# Patient Record
Sex: FEMALE | Race: WHITE | Employment: UNEMPLOYED | URBAN - METROPOLITAN AREA
[De-identification: names, ages, dates, MRNs, and addresses within clinical notes are randomized per-mention and may not be internally consistent; named-entity substitution may affect disease eponyms.]

---

## 2018-09-24 ENCOUNTER — OFFICE VISIT (OUTPATIENT)
Dept: INTERNAL MEDICINE CLINIC | Facility: CLINIC | Age: 53
End: 2018-09-24

## 2018-09-24 VITALS
OXYGEN SATURATION: 99 % | WEIGHT: 159 LBS | BODY MASS INDEX: 23.55 KG/M2 | TEMPERATURE: 98.2 F | RESPIRATION RATE: 18 BRPM | HEART RATE: 76 BPM | DIASTOLIC BLOOD PRESSURE: 80 MMHG | HEIGHT: 69 IN | SYSTOLIC BLOOD PRESSURE: 130 MMHG

## 2018-09-24 DIAGNOSIS — R07.89 MUSCULOSKELETAL CHEST PAIN: ICD-10-CM

## 2018-09-24 DIAGNOSIS — R03.0 PRE-HYPERTENSION: ICD-10-CM

## 2018-09-24 DIAGNOSIS — Z12.83 SCREENING EXAM FOR SKIN CANCER: ICD-10-CM

## 2018-09-24 DIAGNOSIS — Z76.89 ENCOUNTER TO ESTABLISH CARE: Primary | ICD-10-CM

## 2018-09-24 DIAGNOSIS — Z23 ENCOUNTER FOR IMMUNIZATION: ICD-10-CM

## 2018-09-24 NOTE — PATIENT INSTRUCTIONS
Vaccine Information Statement    Influenza (Flu) Vaccine (Inactivated or Recombinant): What you need to know    Many Vaccine Information Statements are available in [place] and other languages. See www.immunize.org/vis  Hojas de Información Sobre Vacunas están disponibles en Español y en muchos otros idiomas. Visite www.immunize.org/vis    1. Why get vaccinated? Influenza (flu) is a contagious disease that spreads around the [place] every year, usually between October and May. Flu is caused by influenza viruses, and is spread mainly by coughing, sneezing, and close contact. Anyone can get flu. Flu strikes suddenly and can last several days. Symptoms vary by age, but can include:   fever/chills   sore throat   muscle aches   fatigue   cough   headache    runny or stuffy nose    Flu can also lead to pneumonia and blood infections, and cause diarrhea and seizures in children. If you have a medical condition, such as heart or lung disease, flu can make it worse. Flu is more dangerous for some people. Infants and young children, people 72years of age and older, pregnant women, and people with certain health conditions or a weakened immune system are at greatest risk. Each year thousands of people in the [facility] die from flu, and many more are hospitalized. Flu vaccine can:   keep you from getting flu,   make flu less severe if you do get it, and   keep you from spreading flu to your family and other people. 2. Inactivated and recombinant flu vaccines    A dose of flu vaccine is recommended every flu season. Children 6 months through 6years of age may need two doses during the same flu season. Everyone else needs only one dose each flu season.        Some inactivated flu vaccines contain a very small amount of a mercury-based preservative called thimerosal. Studies have not shown thimerosal in vaccines to be harmful, but flu vaccines that do not contain thimerosal are available. There is no live flu virus in flu shots. They cannot cause the flu. There are many flu viruses, and they are always changing. Each year a new flu vaccine is made to protect against three or four viruses that are likely to cause disease in the upcoming flu season. But even when the vaccine doesnt exactly match these viruses, it may still provide some protection    Flu vaccine cannot prevent:   flu that is caused by a virus not covered by the vaccine, or   illnesses that look like flu but are not. It takes about 2 weeks for protection to develop after vaccination, and protection lasts through the flu season. 3. Some people should not get this vaccine    Tell the person who is giving you the vaccine:     If you have any severe, life-threatening allergies. If you ever had a life-threatening allergic reaction after a dose of flu vaccine, or have a severe allergy to any part of this vaccine, you may be advised not to get vaccinated. Most, but not all, types of flu vaccine contain a small amount of egg protein.  If you ever had Guillain-Barré Syndrome (also called GBS). Some people with a history of GBS should not get this vaccine. This should be discussed with your doctor.  If you are not feeling well. It is usually okay to get flu vaccine when you have a mild illness, but you might be asked to come back when you feel better. 4. Risks of a vaccine reaction    With any medicine, including vaccines, there is a chance of reactions. These are usually mild and go away on their own, but serious reactions are also possible. Most people who get a flu shot do not have any problems with it.      Minor problems following a flu shot include:    soreness, redness, or swelling where the shot was given     hoarseness   sore, red or itchy eyes   cough   fever   aches   headache   itching   fatigue  If these problems occur, they usually begin soon after the shot and last 1 or 2 days. More serious problems following a flu shot can include the following:     There may be a small increased risk of Guillain-Barré Syndrome (GBS) after inactivated flu vaccine. This risk has been estimated at 1 or 2 additional cases per million people vaccinated. This is much lower than the risk of severe complications from flu, which can be prevented by flu vaccine.  Young children who get the flu shot along with pneumococcal vaccine (PCV13) and/or DTaP vaccine at the same time might be slightly more likely to have a seizure caused by fever. Ask your doctor for more information. Tell your doctor if a child who is getting flu vaccine has ever had a seizure. Problems that could happen after any injected vaccine:      People sometimes faint after a medical procedure, including vaccination. Sitting or lying down for about 15 minutes can help prevent fainting, and injuries caused by a fall. Tell your doctor if you feel dizzy, or have vision changes or ringing in the ears.  Some people get severe pain in the shoulder and have difficulty moving the arm where a shot was given. This happens very rarely.  Any medication can cause a severe allergic reaction. Such reactions from a vaccine are very rare, estimated at about 1 in a million doses, and would happen within a few minutes to a few hours after the vaccination. As with any medicine, there is a very remote chance of a vaccine causing a serious injury or death. The safety of vaccines is always being monitored. For more information, visit: www.cdc.gov/vaccinesafety/    5. What if there is a serious reaction? What should I look for?  Look for anything that concerns you, such as signs of a severe allergic reaction, very high fever, or unusual behavior.     Signs of a severe allergic reaction can include hives, swelling of the face and throat, difficulty breathing, a fast heartbeat, dizziness, and weakness - usually within a few minutes to a few hours after the vaccination. What should I do?  If you think it is a severe allergic reaction or other emergency that cant wait, call 9-1-1 and get the person to the nearest hospital. Otherwise, call your doctor.  Reactions should be reported to the Vaccine Adverse Event Reporting System (VAERS). Your doctor should file this report, or you can do it yourself through  the VAERS web site at www.vaers. [facility].gov, or by calling 0-256.610.3255. VAERS does not give medical advice. 6. The National Vaccine Injury Compensation Program    The [facility] Vaccine Injury Compensation Program (VICP) is a federal program that was created to compensate people who may have been injured by certain vaccines. Persons who believe they may have been injured by a vaccine can learn about the program and about filing a claim by calling 2-634.477.4441 or visiting the [employer] website at www.[facility].gov/vaccinecompensation. There is a time limit to file a claim for compensation. 7. How can I learn more?  Ask your healthcare provider. He or she can give you the vaccine package insert or suggest other sources of information.  Call your local or state health department.  Contact the Centers for Disease Control and Prevention (CDC):  - Call 8-866.622.8594 (1-800-CDC-INFO) or  - Visit CDCs website at www.cdc.gov/flu    Vaccine Information Statement   Inactivated Influenza Vaccine   8/7/2015  43 KAYLA Enrique [id number]    Department of Health and Human Services  Centers for Disease Control and Prevention    Office Use Only       High Blood Pressure: Care Instructions  Your Care Instructions    If your blood pressure is usually above 130/80, you have high blood pressure, or hypertension. That means the top number is 130 or higher or the bottom number is 80 or higher, or both. Despite what a lot of people think, high blood pressure usually doesn't cause headaches or make you feel dizzy or lightheaded.  It usually has no symptoms. But it does increase your risk for heart attack, stroke, and kidney or eye damage. The higher your blood pressure, the more your risk increases. Your doctor will give you a goal for your blood pressure. Your goal will be based on your health and your age. Lifestyle changes, such as eating healthy and being active, are always important to help lower blood pressure. You might also take medicine to reach your blood pressure goal.  Follow-up care is a key part of your treatment and safety. Be sure to make and go to all appointments, and call your doctor if you are having problems. It's also a good idea to know your test results and keep a list of the medicines you take. How can you care for yourself at home? Medical treatment  · If you stop taking your medicine, your blood pressure will go back up. You may take one or more types of medicine to lower your blood pressure. Be safe with medicines. Take your medicine exactly as prescribed. Call your doctor if you think you are having a problem with your medicine. · Talk to your doctor before you start taking aspirin every day. Aspirin can help certain people lower their risk of a heart attack or stroke. But taking aspirin isn't right for everyone, because it can cause serious bleeding. · See your doctor regularly. You may need to see the doctor more often at first or until your blood pressure comes down. · If you are taking blood pressure medicine, talk to your doctor before you take decongestants or anti-inflammatory medicine, such as ibuprofen. Some of these medicines can raise blood pressure. · Learn how to check your blood pressure at home. Lifestyle changes  · Stay at a healthy weight. This is especially important if you put on weight around the waist. Losing even 10 pounds can help you lower your blood pressure. · If your doctor recommends it, get more exercise. Walking is a good choice. Bit by bit, increase the amount you walk every day.  Try for at least 30 minutes on most days of the week. You also may want to swim, bike, or do other activities. · Avoid or limit alcohol. Talk to your doctor about whether you can drink any alcohol. · Try to limit how much sodium you eat to less than 2,300 milligrams (mg) a day. Your doctor may ask you to try to eat less than 1,500 mg a day. · Eat plenty of fruits (such as bananas and oranges), vegetables, legumes, whole grains, and low-fat dairy products. · Lower the amount of saturated fat in your diet. Saturated fat is found in animal products such as milk, cheese, and meat. Limiting these foods may help you lose weight and also lower your risk for heart disease. · Do not smoke. Smoking increases your risk for heart attack and stroke. If you need help quitting, talk to your doctor about stop-smoking programs and medicines. These can increase your chances of quitting for good. When should you call for help? Call 911 anytime you think you may need emergency care. This may mean having symptoms that suggest that your blood pressure is causing a serious heart or blood vessel problem. Your blood pressure may be over 180/110.   For example, call 911 if:    · You have symptoms of a heart attack. These may include:  ¨ Chest pain or pressure, or a strange feeling in the chest.  ¨ Sweating. ¨ Shortness of breath. ¨ Nausea or vomiting. ¨ Pain, pressure, or a strange feeling in the back, neck, jaw, or upper belly or in one or both shoulders or arms. ¨ Lightheadedness or sudden weakness. ¨ A fast or irregular heartbeat.     · You have symptoms of a stroke. These may include:  ¨ Sudden numbness, tingling, weakness, or loss of movement in your face, arm, or leg, especially on only one side of your body. ¨ Sudden vision changes. ¨ Sudden trouble speaking. ¨ Sudden confusion or trouble understanding simple statements. ¨ Sudden problems with walking or balance.   ¨ A sudden, severe headache that is different from past headaches.     · You have severe back or belly pain.    Do not wait until your blood pressure comes down on its own. Get help right away.   Call your doctor now or seek immediate care if:    · Your blood pressure is much higher than normal (such as 180/110 or higher), but you don't have symptoms.     · You think high blood pressure is causing symptoms, such as:  ¨ Severe headache. ¨ Blurry vision.    Watch closely for changes in your health, and be sure to contact your doctor if:    · Your blood pressure measures 140/90 or higher at least 2 times. That means the top number is 140 or higher or the bottom number is 90 or higher, or both.     · You think you may be having side effects from your blood pressure medicine.     · Your blood pressure is usually normal, but it goes above normal at least 2 times. Where can you learn more? Go to http://leyda-beni.info/. Enter V294 in the search box to learn more about \"High Blood Pressure: Care Instructions. \"  Current as of: December 6, 2017  Content Version: 11.7  © 8822-8660 [employer]. Care instructions adapted under license by [employer] (which disclaims liability or warranty for this information). If you have questions about a medical condition or this instruction, always ask your healthcare professional. [address] any warranty or liability for your use of this information. Learning About High Blood Pressure  What is high blood pressure? Blood pressure is a measure of how hard the blood pushes against the walls of your arteries. It's normal for blood pressure to go up and down throughout the day, but if it stays up, you have high blood pressure. Another name for high blood pressure is hypertension. Two numbers tell you your blood pressure. The first number is the systolic pressure. It shows how hard the blood pushes when your heart is pumping. The second number is the diastolic pressure. It shows how hard the blood pushes between heartbeats, when your heart is relaxed and filling with blood. A blood pressure of less than 120/80 (say \"120 over 80\") is ideal for an adult. High blood pressure is 130/80 or higher. You have high blood pressure if your top number is 130 or higher or your bottom number is 80 or higher, or both. What happens when you have high blood pressure? · Blood flows through your arteries with too much force. Over time, this damages the walls of your arteries. But you can't feel it. High blood pressure usually doesn't cause symptoms. · Fat and calcium start to build up in your arteries. This buildup is called plaque. Plaque makes your arteries narrower and stiffer. Blood can't flow through them as easily. · This lack of good blood flow starts to damage some of the organs in your body. This can lead to problems such as coronary artery disease and heart attack, heart failure, stroke, kidney failure, and eye damage. How can you prevent high blood pressure? · Stay at a healthy weight. · Try to limit how much sodium you eat to less than 2,300 milligrams (mg) a day. If you limit your sodium to 1,500 mg a day, you can lower your blood pressure even more. ¨ Buy foods that are labeled \"unsalted,\" \"sodium-free,\" or \"low-sodium. \" Foods labeled \"reduced-sodium\" and \"light sodium\" may still have too much sodium. ¨ Flavor your food with garlic, lemon juice, onion, vinegar, herbs, and spices instead of salt. Do not use soy sauce, steak sauce, onion salt, garlic salt, mustard, or ketchup on your food. ¨ Use less salt (or none) when recipes call for it. You can often use half the salt a recipe calls for without losing flavor. · Be physically active. Get at least 30 minutes of exercise on most days of the week. Walking is a good choice. You also may want to do other activities, such as running, swimming, cycling, or playing tennis or team sports.   · Limit alcohol to 2 drinks a day for men and 1 drink a day for women. · Eat plenty of fruits, vegetables, and low-fat dairy products. Eat less saturated and total fats. How is high blood pressure treated? · Your doctor will suggest making lifestyle changes. For example, your doctor may ask you to eat healthy foods, quit smoking, lose extra weight, and be more active. · If lifestyle changes don't help enough or your blood pressure is very high, you will have to take medicine every day. Follow-up care is a key part of your treatment and safety. Be sure to make and go to all appointments, and call your doctor if you are having problems. It's also a good idea to know your test results and keep a list of the medicines you take. Where can you learn more? Go to http://leyda-beni.info/. Enter P501 in the search box to learn more about \"Learning About High Blood Pressure. \"  Current as of: May 10, 2017  Content Version: 11.7  © 1244-3804 [employer]. Care instructions adapted under license by [employer] (which disclaims liability or warranty for this information). If you have questions about a medical condition or this instruction, always ask your healthcare professional. [place] [address] any warranty or liability for your use of this information. Well Visit, Women 48 to 72: Care Instructions  Your Care Instructions    Physical exams can help you stay healthy. Your doctor has checked your overall health and may have suggested ways to take good care of yourself. He or she also may have recommended tests. At home, you can help prevent illness with healthy eating, regular exercise, and other steps. Follow-up care is a key part of your treatment and safety. Be sure to make and go to all appointments, and call your doctor if you are having problems. It's also a good idea to know your test results and keep a list of the medicines you take. How can you care for yourself at home?   · Reach and stay at a healthy weight. This will lower your risk for many problems, such as obesity, diabetes, heart disease, and high blood pressure. · Get at least 30 minutes of exercise on most days of the week. Walking is a good choice. You also may want to do other activities, such as running, swimming, cycling, or playing tennis or team sports. · Do not smoke. Smoking can make health problems worse. If you need help quitting, talk to your doctor about stop-smoking programs and medicines. These can increase your chances of quitting for good. · Protect your skin from too much sun. When you're outdoors from 10 a.m. to 4 p.m., stay in the shade or cover up with clothing and a hat with a wide brim. Wear sunglasses that block UV rays. Even when it's cloudy, put broad-spectrum sunscreen (SPF 30 or higher) on any exposed skin. · See a dentist one or two times a year for checkups and to have your teeth cleaned. · Wear a seat belt in the car. · Limit alcohol to 1 drink a day. Too much alcohol can cause health problems. Follow your doctor's advice about when to have certain tests. These tests can spot problems early. · Cholesterol. Your doctor will tell you how often to have this done based on your age, family history, or other things that can increase your risk for heart attack and stroke. · Blood pressure. Have your blood pressure checked during a routine doctor visit. Your doctor will tell you how often to check your blood pressure based on your age, your blood pressure results, and other factors. · Mammogram. Ask your doctor how often you should have a mammogram, which is an X-ray of your breasts. A mammogram can spot breast cancer before it can be felt and when it is easiest to treat. · Pap test and pelvic exam. Ask your doctor how often you should have a Pap test. You may not need to have a Pap test as often as you used to. · Vision. Have your eyes checked every year or two or as often as your doctor suggests.  Some experts recommend that you have yearly exams for glaucoma and other age-related eye problems starting at age 48. · Hearing. Tell your doctor if you notice any change in your hearing. You can have tests to find out how well you hear. · Diabetes. Ask your doctor whether you should have tests for diabetes. · Colon cancer. You should begin tests for colon cancer at age 48. You may have one of several tests. Your doctor will tell you how often to have tests based on your age and risk. Risks include whether you already had a precancerous polyp removed from your colon or whether your parents, sisters and brothers, or children have had colon cancer. · Thyroid disease. Talk to your doctor about whether to have your thyroid checked as part of a regular physical exam. Women have an increased chance of a thyroid problem. · Osteoporosis. You should begin tests for bone density at age 72. If you are younger than 72, ask your doctor whether you have factors that may increase your risk for this disease. You may want to have this test before age 72. · Heart attack and stroke risk. At least every 4 to 6 years, you should have your risk for heart attack and stroke assessed. Your doctor uses factors such as your age, blood pressure, cholesterol, and whether you smoke or have diabetes to show what your risk for a heart attack or stroke is over the next 10 years. When should you call for help? Watch closely for changes in your health, and be sure to contact your doctor if you have any problems or symptoms that concern you. Where can you learn more? Go to http://leyda-beni.info/. Enter U247 in the search box to learn more about \"Well Visit, Women 50 to 72: Care Instructions. \"  Current as of: May 16, 2017  Content Version: 11.7  © 7418-7426 [employer]. Care instructions adapted under license by [employer] (which disclaims liability or warranty for this information).  If you have questions about a medical condition or this instruction, always ask your healthcare professional. [place] [address] any warranty or liability for your use of this information.
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
## 2018-09-24 NOTE — MR AVS SNAPSHOT
[address] [address] [phone] Patient: Laura Martinez MRN: [id number] :[id number] Visit Information Date & Time Provider Department Dept. Phone Encounter #  
 2018 11:00 AM Christopher Crain, [address] [facility] [address] [place] [address] [id number] Follow-up Instructions Return in about 3 months (around 2018), or if symptoms worsen or fail to improve, for blood pressure. Upcoming Health Maintenance Date Due  
 PAP AKA CERVICAL CYTOLOGY 1987 Shingrix Vaccine Age 50> (1 of 2) 2016 BREAST CANCER SCRN MAMMOGRAM 2016 FOBT Q 1 YEAR AGE 50-75 2016 Influenza Age 5 to Adult 2018 DTaP/Tdap/Td series (2 - Td) 6/15/2022 Allergies as of 2018  Review Complete On: 2018 By: Christopher Crain NP Severity Noted Reaction Type Reactions Adhesive  2018    Hives Adhesive tapes Sulfa (Sulfonamide Antibiotics)  2011    Nausea and Vomiting Topamax [Topiramate]  2011    Nausea and Vomiting Current Immunizations  Reviewed on 2018 Name Date Influenza Vaccine (Quad) PF  Incomplete TDAP Vaccine 6/15/2012  1:37 PM  
  
 Reviewed by Matti Zapata LPN on 3311 at 98:87 AM  
You Were Diagnosed With   
  
 Codes Comments Encounter to establish care    -  Primary ICD-10-CM: Z76.89 
ICD-9-CM: V65.8 Encounter for immunization     ICD-10-CM: M30 ICD-9-CM: V03.89 Screening exam for skin cancer     ICD-10-CM: Z12.83 ICD-9-CM: V76.43 Pre-hypertension     ICD-10-CM: R03.0 ICD-9-CM: 796.2 Vitals BP Pulse Temp Resp Height(growth percentile) Weight(growth percentile) 130/80 76 98.2 °F (36.8 °C) (Oral) 18 5' 9\" (1.753 m) 159 lb (72.1 kg) SpO2 BMI OB Status Smoking Status 99% 23.48 kg/m2 Menopause Never Smoker Vitals History BMI and BSA Data Body Mass Index Body Surface Area  
 23.48 kg/m 2 1.87 m 2 Preferred Pharmacy Pharmacy Name Phone [id number] [address] [phone] Your Updated Medication List  
  
Notice  As of 9/24/2018 11:38 AM  
 You have not been prescribed any medications. We Performed the Following INFLUENZA VIRUS VAC QUAD,SPLIT,PRESV FREE SYRINGE IM [id number] CPT(R)] WI IMMUNIZ ADMIN,1 SINGLE/COMB VAC/TOXOID [id number] CPT(R)] REFERRAL TO DERMATOLOGY [REF19 Custom] Follow-up Instructions Return in about 3 months (around 12/24/2018), or if symptoms worsen or fail to improve, for blood pressure. Referral Information Referral ID Referred By Referred To  
  
 [id number] [facility] [address] Phone: [phone] Fax: [phone] Visits Status Start Date End Date 1 New Request 9/24/18 9/24/19 If your referral has a status of pending review or denied, additional information will be sent to support the outcome of this decision. Patient Instructions Vaccine Information Statement Influenza (Flu) Vaccine (Inactivated or Recombinant): What you need to know Many Vaccine Information Statements are available in [place] and other languages. See www.immunize.org/vis Hojas de Información Sobre Vacunas están disponibles en Español y en muchos otros idiomas. Visite www.immunize.org/vis 1. Why get vaccinated? Influenza (flu) is a contagious disease that spreads around the [place] every year, usually between October and May. Flu is caused by influenza viruses, and is spread mainly by coughing, sneezing, and close contact. Anyone can get flu. Flu strikes suddenly and can last several days. Symptoms vary by age, but can include: 
 fever/chills  sore throat  muscle aches  fatigue  cough  headache  runny or stuffy nose Flu can also lead to pneumonia and blood infections, and cause diarrhea and seizures in children. If you have a medical condition, such as heart or lung disease, flu can make it worse. Flu is more dangerous for some people. Infants and young children, people 72years of age and older, pregnant women, and people with certain health conditions or a weakened immune system are at greatest risk. Each year thousands of people in the [facility] die from flu, and many more are hospitalized. Flu vaccine can: 
 keep you from getting flu, 
 make flu less severe if you do get it, and 
 keep you from spreading flu to your family and other people. 2. Inactivated and recombinant flu vaccines A dose of flu vaccine is recommended every flu season. Children 6 months through 6years of age may need two doses during the same flu season. Everyone else needs only one dose each flu season. Some inactivated flu vaccines contain a very small amount of a mercury-based preservative called thimerosal. Studies have not shown thimerosal in vaccines to be harmful, but flu vaccines that do not contain thimerosal are available. There is no live flu virus in flu shots. They cannot cause the flu. There are many flu viruses, and they are always changing. Each year a new flu vaccine is made to protect against three or four viruses that are likely to cause disease in the upcoming flu season. But even when the vaccine doesnt exactly match these viruses, it may still provide some protection Flu vaccine cannot prevent: 
 flu that is caused by a virus not covered by the vaccine, or 
 illnesses that look like flu but are not. It takes about 2 weeks for protection to develop after vaccination, and protection lasts through the flu season. 3. Some people should not get this vaccine Tell the person who is giving you the vaccine:  If you have any severe, life-threatening allergies. If you ever had a life-threatening allergic reaction after a dose of flu vaccine, or have a severe allergy to any part of this vaccine, you may be advised not to get vaccinated. Most, but not all, types of flu vaccine contain a small amount of egg protein.  If you ever had Guillain-Barré Syndrome (also called GBS). Some people with a history of GBS should not get this vaccine. This should be discussed with your doctor.  If you are not feeling well. It is usually okay to get flu vaccine when you have a mild illness, but you might be asked to come back when you feel better. 4. Risks of a vaccine reaction With any medicine, including vaccines, there is a chance of reactions. These are usually mild and go away on their own, but serious reactions are also possible. Most people who get a flu shot do not have any problems with it. Minor problems following a flu shot include:  
 soreness, redness, or swelling where the shot was given  hoarseness  sore, red or itchy eyes  cough  fever  aches  headache  itching  fatigue If these problems occur, they usually begin soon after the shot and last 1 or 2 days. More serious problems following a flu shot can include the following:  There may be a small increased risk of Guillain-Barré Syndrome (GBS) after inactivated flu vaccine. This risk has been estimated at 1 or 2 additional cases per million people vaccinated. This is much lower than the risk of severe complications from flu, which can be prevented by flu vaccine.  Young children who get the flu shot along with pneumococcal vaccine (PCV13) and/or DTaP vaccine at the same time might be slightly more likely to have a seizure caused by fever. Ask your doctor for more information. Tell your doctor if a child who is getting flu vaccine has ever had a seizure. Problems that could happen after any injected vaccine:  People sometimes faint after a medical procedure, including vaccination. Sitting or lying down for about 15 minutes can help prevent fainting, and injuries caused by a fall. Tell your doctor if you feel dizzy, or have vision changes or ringing in the ears.  Some people get severe pain in the shoulder and have difficulty moving the arm where a shot was given. This happens very rarely.  Any medication can cause a severe allergic reaction. Such reactions from a vaccine are very rare, estimated at about 1 in a million doses, and would happen within a few minutes to a few hours after the vaccination. As with any medicine, there is a very remote chance of a vaccine causing a serious injury or death. The safety of vaccines is always being monitored. For more information, visit: www.cdc.gov/vaccinesafety/ 
 
 
6. The National Vaccine Injury Compensation Program 
 
The [facility] Vaccine Injury Compensation Program (VICP) is a federal program that was created to compensate people who may have been injured by certain vaccines. Persons who believe they may have been injured by a vaccine can learn about the program and about filing a claim by calling 0-164.393.7989 or visiting the [employer] website at www.[facility].gov/vaccinecompensation. There is a time limit to file a claim for compensation. 7. How can I learn more?  Ask your healthcare provider. He or she can give you the vaccine package insert or suggest other sources of information.  Call your local or state health department.  Contact the Centers for Disease Control and Prevention (CDC): 
- Call 4-836.452.1617 (1-800-CDC-INFO) or 
- Visit CDCs website at www.cdc.gov/flu Vaccine Information Statement Inactivated Influenza Vaccine 8/7/2015 
42 KAYLA Thomas [id number] [facility] [employer] Centers for Disease Control and Prevention Office Use Only High Blood Pressure: Care Instructions Your Care Instructions If your blood pressure is usually above 130/80, you have high blood pressure, or hypertension. That means the top number is 130 or higher or the bottom number is 80 or higher, or both. Despite what a lot of people think, high blood pressure usually doesn't cause headaches or make you feel dizzy or lightheaded. It usually has no symptoms. But it does increase your risk for heart attack, stroke, and kidney or eye damage. The higher your blood pressure, the more your risk increases. Your doctor will give you a goal for your blood pressure. Your goal will be based on your health and your age. Lifestyle changes, such as eating healthy and being active, are always important to help lower blood pressure. You might also take medicine to reach your blood pressure goal. 
Follow-up care is a key part of your treatment and safety.  Be sure to make and go to all appointments, and call your doctor if you are having problems. It's also a good idea to know your test results and keep a list of the medicines you take. How can you care for yourself at home? Medical treatment · If you stop taking your medicine, your blood pressure will go back up. You may take one or more types of medicine to lower your blood pressure. Be safe with medicines. Take your medicine exactly as prescribed. Call your doctor if you think you are having a problem with your medicine. · Talk to your doctor before you start taking aspirin every day. Aspirin can help certain people lower their risk of a heart attack or stroke. But taking aspirin isn't right for everyone, because it can cause serious bleeding. · See your doctor regularly. You may need to see the doctor more often at first or until your blood pressure comes down. · If you are taking blood pressure medicine, talk to your doctor before you take decongestants or anti-inflammatory medicine, such as ibuprofen. Some of these medicines can raise blood pressure. · Learn how to check your blood pressure at home. Lifestyle changes · Stay at a healthy weight. This is especially important if you put on weight around the waist. Losing even 10 pounds can help you lower your blood pressure. · If your doctor recommends it, get more exercise. Walking is a good choice. Bit by bit, increase the amount you walk every day. Try for at least 30 minutes on most days of the week. You also may want to swim, bike, or do other activities. · Avoid or limit alcohol. Talk to your doctor about whether you can drink any alcohol. · Try to limit how much sodium you eat to less than 2,300 milligrams (mg) a day. Your doctor may ask you to try to eat less than 1,500 mg a day. · Eat plenty of fruits (such as bananas and oranges), vegetables, legumes, whole grains, and low-fat dairy products. · Lower the amount of saturated fat in your diet.  Saturated fat is found in animal products such as milk, cheese, and meat. Limiting these foods may help you lose weight and also lower your risk for heart disease. · Do not smoke. Smoking increases your risk for heart attack and stroke. If you need help quitting, talk to your doctor about stop-smoking programs and medicines. These can increase your chances of quitting for good. When should you call for help? Call 911 anytime you think you may need emergency care. This may mean having symptoms that suggest that your blood pressure is causing a serious heart or blood vessel problem. Your blood pressure may be over 180/110. 
 For example, call 911 if: 
  · You have symptoms of a heart attack. These may include: ¨ Chest pain or pressure, or a strange feeling in the chest. 
¨ Sweating. ¨ Shortness of breath. ¨ Nausea or vomiting. ¨ Pain, pressure, or a strange feeling in the back, neck, jaw, or upper belly or in one or both shoulders or arms. ¨ Lightheadedness or sudden weakness. ¨ A fast or irregular heartbeat.  
  · You have symptoms of a stroke. These may include: 
¨ Sudden numbness, tingling, weakness, or loss of movement in your face, arm, or leg, especially on only one side of your body. ¨ Sudden vision changes. ¨ Sudden trouble speaking. ¨ Sudden confusion or trouble understanding simple statements. ¨ Sudden problems with walking or balance. ¨ A sudden, severe headache that is different from past headaches.  
  · You have severe back or belly pain.  
 Do not wait until your blood pressure comes down on its own. Get help right away. 
 Call your doctor now or seek immediate care if: 
  · Your blood pressure is much higher than normal (such as 180/110 or higher), but you don't have symptoms.  
  · You think high blood pressure is causing symptoms, such as: ¨ Severe headache. ¨ Blurry vision.  
 Watch closely for changes in your health, and be sure to contact your doctor if:   · Your blood pressure measures 140/90 or higher at least 2 times. That means the top number is 140 or higher or the bottom number is 90 or higher, or both.  
  · You think you may be having side effects from your blood pressure medicine.  
  · Your blood pressure is usually normal, but it goes above normal at least 2 times. Where can you learn more? Go to http://leyda-beni.info/. Enter E142 in the search box to learn more about \"High Blood Pressure: Care Instructions. \" Current as of: December 6, 2017 Content Version: 11.7 © 5529-6149 [employer]. Care instructions adapted under license by [employer] (which disclaims liability or warranty for this information). If you have questions about a medical condition or this instruction, always ask your healthcare professional. [address] any warranty or liability for your use of this information. Learning About High Blood Pressure What is high blood pressure? Blood pressure is a measure of how hard the blood pushes against the walls of your arteries. It's normal for blood pressure to go up and down throughout the day, but if it stays up, you have high blood pressure. Another name for high blood pressure is hypertension. Two numbers tell you your blood pressure. The first number is the systolic pressure. It shows how hard the blood pushes when your heart is pumping. The second number is the diastolic pressure. It shows how hard the blood pushes between heartbeats, when your heart is relaxed and filling with blood. A blood pressure of less than 120/80 (say \"120 over 80\") is ideal for an adult. High blood pressure is 130/80 or higher. You have high blood pressure if your top number is 130 or higher or your bottom number is 80 or higher, or both. What happens when you have high blood pressure? · Blood flows through your arteries with too much force.  Over time, this damages the walls of your arteries. But you can't feel it. High blood pressure usually doesn't cause symptoms. · Fat and calcium start to build up in your arteries. This buildup is called plaque. Plaque makes your arteries narrower and stiffer. Blood can't flow through them as easily. · This lack of good blood flow starts to damage some of the organs in your body. This can lead to problems such as coronary artery disease and heart attack, heart failure, stroke, kidney failure, and eye damage. How can you prevent high blood pressure? · Stay at a healthy weight. · Try to limit how much sodium you eat to less than 2,300 milligrams (mg) a day. If you limit your sodium to 1,500 mg a day, you can lower your blood pressure even more. ¨ Buy foods that are labeled \"unsalted,\" \"sodium-free,\" or \"low-sodium. \" Foods labeled \"reduced-sodium\" and \"light sodium\" may still have too much sodium. ¨ Flavor your food with garlic, lemon juice, onion, vinegar, herbs, and spices instead of salt. Do not use soy sauce, steak sauce, onion salt, garlic salt, mustard, or ketchup on your food. ¨ Use less salt (or none) when recipes call for it. You can often use half the salt a recipe calls for without losing flavor. · Be physically active. Get at least 30 minutes of exercise on most days of the week. Walking is a good choice. You also may want to do other activities, such as running, swimming, cycling, or playing tennis or team sports. · Limit alcohol to 2 drinks a day for men and 1 drink a day for women. · Eat plenty of fruits, vegetables, and low-fat dairy products. Eat less saturated and total fats. How is high blood pressure treated? · Your doctor will suggest making lifestyle changes. For example, your doctor may ask you to eat healthy foods, quit smoking, lose extra weight, and be more active. · If lifestyle changes don't help enough or your blood pressure is very high, you will have to take medicine every day. Follow-up care is a key part of your treatment and safety. Be sure to make and go to all appointments, and call your doctor if you are having problems. It's also a good idea to know your test results and keep a list of the medicines you take. Where can you learn more? Go to http://leyda-beni.info/. Enter P501 in the search box to learn more about \"Learning About High Blood Pressure. \" Current as of: May 10, 2017 Content Version: 11.7 © 7721-8277 [employer]. Care instructions adapted under license by [employer] (which disclaims liability or warranty for this information). If you have questions about a medical condition or this instruction, always ask your healthcare professional. [address] any warranty or liability for your use of this information. Well Visit, Women 48 to 72: Care Instructions Your Care Instructions Physical exams can help you stay healthy. Your doctor has checked your overall health and may have suggested ways to take good care of yourself. He or she also may have recommended tests. At home, you can help prevent illness with healthy eating, regular exercise, and other steps. Follow-up care is a key part of your treatment and safety. Be sure to make and go to all appointments, and call your doctor if you are having problems. It's also a good idea to know your test results and keep a list of the medicines you take. How can you care for yourself at home? · Reach and stay at a healthy weight. This will lower your risk for many problems, such as obesity, diabetes, heart disease, and high blood pressure. · Get at least 30 minutes of exercise on most days of the week. Walking is a good choice. You also may want to do other activities, such as running, swimming, cycling, or playing tennis or team sports. · Do not smoke. Smoking can make health problems worse.  If you need help quitting, talk to your doctor about stop-smoking programs and medicines. These can increase your chances of quitting for good. · Protect your skin from too much sun. When you're outdoors from 10 a.m. to 4 p.m., stay in the shade or cover up with clothing and a hat with a wide brim. Wear sunglasses that block UV rays. Even when it's cloudy, put broad-spectrum sunscreen (SPF 30 or higher) on any exposed skin. · See a dentist one or two times a year for checkups and to have your teeth cleaned. · Wear a seat belt in the car. · Limit alcohol to 1 drink a day. Too much alcohol can cause health problems. Follow your doctor's advice about when to have certain tests. These tests can spot problems early. · Cholesterol. Your doctor will tell you how often to have this done based on your age, family history, or other things that can increase your risk for heart attack and stroke. · Blood pressure. Have your blood pressure checked during a routine doctor visit. Your doctor will tell you how often to check your blood pressure based on your age, your blood pressure results, and other factors. · Mammogram. Ask your doctor how often you should have a mammogram, which is an X-ray of your breasts. A mammogram can spot breast cancer before it can be felt and when it is easiest to treat. · Pap test and pelvic exam. Ask your doctor how often you should have a Pap test. You may not need to have a Pap test as often as you used to. · Vision. Have your eyes checked every year or two or as often as your doctor suggests. Some experts recommend that you have yearly exams for glaucoma and other age-related eye problems starting at age 48. · Hearing. Tell your doctor if you notice any change in your hearing. You can have tests to find out how well you hear. · Diabetes. Ask your doctor whether you should have tests for diabetes. · Colon cancer. You should begin tests for colon cancer at age 48.  You may have one of several tests. Your doctor will tell you how often to have tests based on your age and risk. Risks include whether you already had a precancerous polyp removed from your colon or whether your parents, sisters and brothers, or children have had colon cancer. · Thyroid disease. Talk to your doctor about whether to have your thyroid checked as part of a regular physical exam. Women have an increased chance of a thyroid problem. · Osteoporosis. You should begin tests for bone density at age 72. If you are younger than 72, ask your doctor whether you have factors that may increase your risk for this disease. You may want to have this test before age 72. · Heart attack and stroke risk. At least every 4 to 6 years, you should have your risk for heart attack and stroke assessed. Your doctor uses factors such as your age, blood pressure, cholesterol, and whether you smoke or have diabetes to show what your risk for a heart attack or stroke is over the next 10 years. When should you call for help? Watch closely for changes in your health, and be sure to contact your doctor if you have any problems or symptoms that concern you. Where can you learn more? Go to http://leyda-beni.info/. Enter V314 in the search box to learn more about \"Well Visit, Women 50 to 72: Care Instructions. \" Current as of: May 16, 2017 Content Version: 11.7 © 2358-5028 [employer]. Care instructions adapted under license by [employer] (which disclaims liability or warranty for this information). If you have questions about a medical condition or this instruction, always ask your healthcare professional. [address] [address] any warranty or liability for your use of this information. Introducing [facility]! [employer]: Thank you for requesting a [employer] account. Our records indicate that you already have an active [employer] account.   You can access your account anytime at [email or web address] Did you know that you can access your hospital and ER discharge instructions at any time in [employer]? You can also review all of your test results from your hospital stay or ER visit. Additional Information If you have questions, please visit the Frequently Asked Questions section of the [employer] website at [email or web address] Remember, [employer] is NOT to be used for urgent needs. For medical emergencies, dial 911. Now available from your iPhone and Android! Please provide this summary of care documentation to your next provider. Your primary care clinician is listed as Rasheed Alvarez. If you have any questions after today's visit, please call [phone].

## 2018-09-28 ENCOUNTER — TELEPHONE (OUTPATIENT)
Dept: INTERNAL MEDICINE CLINIC | Facility: CLINIC | Age: 53
End: 2018-09-28

## 2018-09-28 DIAGNOSIS — Z76.89 ENCOUNTER TO ESTABLISH CARE: ICD-10-CM

## 2018-09-28 DIAGNOSIS — R03.0 PRE-HYPERTENSION: Primary | ICD-10-CM

## 2018-09-28 NOTE — TELEPHONE ENCOUNTER
PLs let pt know we received labs from her GYN and it looks like they did not due a metabolic panel or lipid panel which I would like her to have as a baseline. She can come in anytime next week, the orders are in.

## 2018-11-19 ENCOUNTER — TELEPHONE (OUTPATIENT)
Dept: INTERNAL MEDICINE CLINIC | Facility: CLINIC | Age: 53
End: 2018-11-19

## 2018-11-19 DIAGNOSIS — I10 ESSENTIAL HYPERTENSION: Primary | ICD-10-CM

## 2018-11-19 RX ORDER — AMLODIPINE BESYLATE 5 MG/1
5 TABLET ORAL DAILY
Qty: 30 TAB | Refills: 3 | Status: SHIPPED | OUTPATIENT
Start: 2018-11-19 | End: 2019-01-02 | Stop reason: SDUPTHER

## 2018-11-19 NOTE — TELEPHONE ENCOUNTER
Pt called stating she was told to keep track of her BP. Pt last BP reading around 5:00 pm yesterday was 137/98. Pt wants to speak with [facility] [place].

## 2018-11-19 NOTE — TELEPHONE ENCOUNTER
It is reasonable to start pt on low dose BP medication given BP was elevated at last office visit. I will send amlodipine to her pharmacy. Most common possible side effect is swelling in the lower legs. I will see her back next month to evaluate her BP on the new med.

## 2018-12-14 PROBLEM — I10 ESSENTIAL HYPERTENSION: Status: ACTIVE | Noted: 2018-12-14

## 2018-12-14 NOTE — PROGRESS NOTES
ALEXA Gil is a 47y.o. year old female patient of Cinthia Leggett NP who presents with c/o 3 mos f/u   Pt has history of has Migraine and Essential hypertension on their problem list.. HTN  Home BP: 120/80s  Medication regimen/ADR/missed doses: started amlodipine 5mg after last visit due to continued elevated BP's  Denies HA, dizziness, blurred vision, or swelling in extremities. C/o intermittent chest pains. Denies heartburn, indigestion, frequent belching, or post food emesis. Pain is right middle of chest 3/10, points to epigastric area, feels like chest pressure, feels like a brick sitting on her chest.   Often occurs at night while lying in bed but also occurs randomly during the day, not associated with exertion. Pain doesn't radiate. No associated SOB or BURRELL. Denies palpitations. Patient Active Problem List   Diagnosis Code    Migraine G43.909     Past Medical History:   Diagnosis Date    Migraine      Past Surgical History:   Procedure Laterality Date    HX  SECTION  , ,     HX MOHS PROCEDURES  2008    left    HX ORTHOPAEDIC Left     RCR    HX OTHER SURGICAL      Polyp removed      Social History     Socioeconomic History    Marital status:      Spouse name: Not on file    Number of children: Not on file    Years of education: Not on file    Highest education level: Not on file   Tobacco Use    Smoking status: Never Smoker    Smokeless tobacco: Never Used   Substance and Sexual Activity    Alcohol use: Yes     Alcohol/week: 1.2 oz     Types: 2 Glasses of wine per week     Comment: 2-4/wk    Drug use: No    Sexual activity: Yes     Birth control/protection: None     Comment: last period    Social History Narrative    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 Previously served in [employer].       Family History   Problem Relation Age of Onset   [employer] Stroke Mother     Heart Disease Mother     Hypertension Mother     Cancer Father prostate    Cancer Brother         prostate    Hypertension Brother     Heart Disease Maternal Grandmother     Stroke Maternal Grandmother     Heart Disease Maternal Grandfather     Cancer Other         rhabdosarcoma     Allergies   Allergen Reactions    Adhesive Hives     Adhesive tapes    Sulfa (Sulfonamide Antibiotics) Nausea and Vomiting    Topamax [Topiramate] Nausea and Vomiting       MEDICATIONS  Current Outpatient Medications   Medication Sig    amLODIPine (NORVASC) 5 mg tablet Take 1 Tab by mouth daily. No current facility-administered medications for this visit. REVIEW OF SYSTEMS  Per HPI        Visit Vitals  /70   Pulse 79   Temp 97.9 °F (36.6 °C) (Oral)   Resp 18   Ht 5' 9\" (1.753 m)   Wt 161 lb (73 kg)   SpO2 99%   BMI 23.78 kg/m²         General: Well-developed, well-nourished. In no distress. A&O x 3. Head: Normocephalic, atraumatic. Eyes: Conjunctiva clear. Mouth/Throat: Lips, mucosa, and tongue normal.   Neck: Supple, symmetrical, trachea midline, no lymphadenopathy, no carotid bruits, no JVD, thyroid: not enlarged, symmetric, no tenderness/mass/nodules. Lungs: Clear to auscultation bilaterally. No crackles or wheezes. No use of accessory muscles. Speaks in full sentences without SOB. Chest Wall: No tenderness or deformity. Heart: RRR, normal S1 and S2, no murmur, click, rub, or gallop. Back: Symmetric. Skin: No rashes or lesions. Neurovasc: No edema appreciated. Dorsalis pedis pulses are 2+ on the right side, and 2+ on the left side. Posterior tibial pulses are 2+ on the right side, and 2+ on the left side. Musculoskeletal: Gait normal.   Psychiatric: Normal mood and affect. Behavior is normal.     EKG: normal EKG, normal sinus rhythm  Reviewed with Dr. Parviz Garcia.       Lab Results   Component Value Date/Time    Sodium 141 10/09/2018 09:04 AM    Potassium 4.1 10/09/2018 09:04 AM    Chloride 102 10/09/2018 09:04 AM    CO2 23 10/09/2018 09:04 AM    Anion gap 8 04/07/2011 12:00 PM    Glucose 95 10/09/2018 09:04 AM    BUN 14 10/09/2018 09:04 AM    Creatinine 0.92 10/09/2018 09:04 AM    BUN/Creatinine ratio 15 10/09/2018 09:04 AM    GFR est AA 83 10/09/2018 09:04 AM    GFR est non-AA 72 10/09/2018 09:04 AM    Calcium 9.5 10/09/2018 09:04 AM    Bilirubin, total 0.5 10/09/2018 09:04 AM    AST (SGOT) 17 10/09/2018 09:04 AM    Alk. phosphatase 85 10/09/2018 09:04 AM    Protein, total 6.9 10/09/2018 09:04 AM    Albumin 4.6 10/09/2018 09:04 AM    Globulin 2.9 04/07/2011 12:00 PM    A-G Ratio 2.0 10/09/2018 09:04 AM    ALT (SGPT) 12 10/09/2018 09:04 AM       Lab Results   Component Value Date/Time    Cholesterol, total 194 10/09/2018 09:04 AM    HDL Cholesterol 62 10/09/2018 09:04 AM    LDL, calculated 114 (H) 10/09/2018 09:04 AM    VLDL, calculated 18 10/09/2018 09:04 AM    Triglyceride 88 10/09/2018 09:04 AM         ASSESSMENT and PLAN  Diagnoses and all orders for this visit:    1. Essential hypertension  Well controlled on new regimen. 2. Chest pressure  -     AMB POC EKG ROUTINE W/ 12 LEADS, INTER & REP  -     raNITIdine (ZANTAC) 150 mg tablet; Take 1 Tab by mouth nightly. On empty stomach.  -Suspect GERD-related epigastric pain, will trial zantac for few weeks and eval for any improvement in symptoms. Patient Instructions          Learning About High Blood Pressure  What is high blood pressure? Blood pressure is a measure of how hard the blood pushes against the walls of your arteries. It's normal for blood pressure to go up and down throughout the day, but if it stays up, you have high blood pressure. Another name for high blood pressure is hypertension. Two numbers tell you your blood pressure. The first number is the systolic pressure. It shows how hard the blood pushes when your heart is pumping. The second number is the diastolic pressure.  It shows how hard the blood pushes between heartbeats, when your heart is relaxed and filling with blood. A blood pressure of less than 120/80 (say \"120 over 80\") is ideal for an adult. High blood pressure is 130/80 or higher. You have high blood pressure if your top number is 130 or higher or your bottom number is 80 or higher, or both. What happens when you have high blood pressure? · Blood flows through your arteries with too much force. Over time, this damages the walls of your arteries. But you can't feel it. High blood pressure usually doesn't cause symptoms. · Fat and calcium start to build up in your arteries. This buildup is called plaque. Plaque makes your arteries narrower and stiffer. Blood can't flow through them as easily. · This lack of good blood flow starts to damage some of the organs in your body. This can lead to problems such as coronary artery disease and heart attack, heart failure, stroke, kidney failure, and eye damage. How can you prevent high blood pressure? · Stay at a healthy weight. · Try to limit how much sodium you eat to less than 2,300 milligrams (mg) a day. If you limit your sodium to 1,500 mg a day, you can lower your blood pressure even more. ? Buy foods that are labeled \"unsalted,\" \"sodium-free,\" or \"low-sodium. \" Foods labeled \"reduced-sodium\" and \"light sodium\" may still have too much sodium. ? Flavor your food with garlic, lemon juice, onion, vinegar, herbs, and spices instead of salt. Do not use soy sauce, steak sauce, onion salt, garlic salt, mustard, or ketchup on your food. ? Use less salt (or none) when recipes call for it. You can often use half the salt a recipe calls for without losing flavor. · Be physically active. Get at least 30 minutes of exercise on most days of the week. Walking is a good choice. You also may want to do other activities, such as running, swimming, cycling, or playing tennis or team sports. · Limit alcohol to 2 drinks a day for men and 1 drink a day for women.   · Eat plenty of fruits, vegetables, and low-fat dairy products. Eat less saturated and total fats. How is high blood pressure treated? · Your doctor will suggest making lifestyle changes. For example, your doctor may ask you to eat healthy foods, quit smoking, lose extra weight, and be more active. · If lifestyle changes don't help enough, your doctor may recommend that you take medicine. · When blood pressure is very high, medicines are needed to lower it. Follow-up care is a key part of your treatment and safety. Be sure to make and go to all appointments, and call your doctor if you are having problems. It's also a good idea to know your test results and keep a list of the medicines you take. Where can you learn more? Go to http://leyda-beni.info/. Enter P501 in the search box to learn more about \"Learning About High Blood Pressure. \"  Current as of: December 6, 2017  Content Version: 11.8  © 2685-4144 [employer]. Care instructions adapted under license by [employer] (which disclaims liability or warranty for this information). If you have questions about a medical condition or this instruction, always ask your healthcare professional. [place] [address] any warranty or liability for your use of this information. Chest Pain: Care Instructions  Your Care Instructions    There are many things that can cause chest pain. Some are not serious and will get better on their own in a few days. But some kinds of chest pain need more testing and treatment. Your doctor may have recommended a follow-up visit in the next 8 to 12 hours. If you are not getting better, you may need more tests or treatment. Even though your doctor has released you, you still need to watch for any problems. The doctor carefully checked you, but sometimes problems can develop later. If you have new symptoms or if your symptoms do not get better, get medical care right away.   If you have worse or different chest pain or pressure that lasts more than 5 minutes or you passed out (lost consciousness), call 911 or seek other emergency help right away. A medical visit is only one step in your treatment. Even if you feel better, you still need to do what your doctor recommends, such as going to all suggested follow-up appointments and taking medicines exactly as directed. This will help you recover and help prevent future problems. How can you care for yourself at home? · Rest until you feel better. · Take your medicine exactly as prescribed. Call your doctor if you think you are having a problem with your medicine. · Do not drive after taking a prescription pain medicine. When should you call for help? Call 911 if:    · You passed out (lost consciousness).     · You have severe difficulty breathing.     · You have symptoms of a heart attack. These may include:  ? Chest pain or pressure, or a strange feeling in your chest.  ? Sweating. ? Shortness of breath. ? Nausea or vomiting. ? Pain, pressure, or a strange feeling in your back, neck, jaw, or upper belly or in one or both shoulders or arms. ? Lightheadedness or sudden weakness. ? A fast or irregular heartbeat. After you call 911, the  may tell you to chew 1 adult-strength or 2 to 4 low-dose aspirin. Wait for an ambulance. Do not try to drive yourself.    Call your doctor today if:    · You have any trouble breathing.     · Your chest pain gets worse.     · You are dizzy or lightheaded, or you feel like you may faint.     · You are not getting better as expected.     · You are having new or different chest pain. Where can you learn more? Go to http://leyda-beni.info/. Enter A120 in the search box to learn more about \"Chest Pain: Care Instructions. \"  Current as of: November 20, 2017  Content Version: 11.8  © 5466-5183 [employer]  Care instructions adapted under license by [employer] (which disclaims liability or warranty for this information). If you have questions about a medical condition or this instruction, always ask your healthcare professional. [address] any warranty or liability for your use of this information. Chest Pain: Care Instructions  Your Care Instructions    There are many things that can cause chest pain. Some are not serious and will get better on their own in a few days. But some kinds of chest pain need more testing and treatment. Your doctor may have recommended a follow-up visit in the next 8 to 12 hours. If you are not getting better, you may need more tests or treatment. Even though your doctor has released you, you still need to watch for any problems. The doctor carefully checked you, but sometimes problems can develop later. If you have new symptoms or if your symptoms do not get better, get medical care right away. If you have worse or different chest pain or pressure that lasts more than 5 minutes or you passed out (lost consciousness), call 911 or seek other emergency help right away. A medical visit is only one step in your treatment. Even if you feel better, you still need to do what your doctor recommends, such as going to all suggested follow-up appointments and taking medicines exactly as directed. This will help you recover and help prevent future problems. How can you care for yourself at home? · Rest until you feel better. · Take your medicine exactly as prescribed. Call your doctor if you think you are having a problem with your medicine. · Do not drive after taking a prescription pain medicine. When should you call for help? Call 911 if:    · You passed out (lost consciousness).     · You have severe difficulty breathing.     · You have symptoms of a heart attack. These may include:  ? Chest pain or pressure, or a strange feeling in your chest.  ? Sweating. ? Shortness of breath. ? Nausea or vomiting.   ? Pain, pressure, or a strange feeling in your back, neck, jaw, or upper belly or in one or both shoulders or arms. ? Lightheadedness or sudden weakness. ? A fast or irregular heartbeat. After you call 911, the  may tell you to chew 1 adult-strength or 2 to 4 low-dose aspirin. Wait for an ambulance. Do not try to drive yourself.    Call your doctor today if:    · You have any trouble breathing.     · Your chest pain gets worse.     · You are dizzy or lightheaded, or you feel like you may faint.     · You are not getting better as expected.     · You are having new or different chest pain. Where can you learn more? Go to http://leyda-beni.info/. Enter A120 in the search box to learn more about \"Chest Pain: Care Instructions. \"  Current as of: November 20, 2017  Content Version: 11.8  © 8263-2023 [employer]. Care instructions adapted under license by [employer] (which disclaims liability or warranty for this information). If you have questions about a medical condition or this instruction, always ask your healthcare professional. [place] [address] any warranty or liability for your use of this information. Please keep your follow-up appointment with Jagdish Baldwin NP. Follow-up Disposition:  Return if symptoms worsen or fail to improve. Health Maintenance Due   Topic Date Due    PAP AKA CERVICAL CYTOLOGY  05/25/1987    Shingrix Vaccine Age 50> (1 of 2) 05/25/2016    FOBT Q 1 YEAR AGE 50-75  05/25/2016   Had PAP at [facility] 2018, no hx of abnormal pap. Had Mammo at [facility] 2018, no hx of abnormal mammo. [facility] I have discussed the diagnosis with the patient and the intended plan as seen in the above orders. Patient is in agreement. The patient has received an after-visit summary and questions were answered concerning future plans.   I have discussed medication side effects and warnings with the patient as well. Warning signs for the above conditions were discussed including when to call our office or go to the emergency room. The nurse provided the patient and/or family with advanced directive information if needed and encouraged the patient to provide a copy to the office when available.

## 2018-12-17 ENCOUNTER — OFFICE VISIT (OUTPATIENT)
Dept: INTERNAL MEDICINE CLINIC | Facility: CLINIC | Age: 53
End: 2018-12-17

## 2018-12-17 VITALS
DIASTOLIC BLOOD PRESSURE: 70 MMHG | WEIGHT: 161 LBS | RESPIRATION RATE: 18 BRPM | SYSTOLIC BLOOD PRESSURE: 120 MMHG | TEMPERATURE: 97.9 F | HEART RATE: 79 BPM | HEIGHT: 69 IN | BODY MASS INDEX: 23.85 KG/M2 | OXYGEN SATURATION: 99 %

## 2018-12-17 DIAGNOSIS — I10 ESSENTIAL HYPERTENSION: Primary | ICD-10-CM

## 2018-12-17 DIAGNOSIS — R07.89 CHEST PRESSURE: ICD-10-CM

## 2018-12-17 RX ORDER — RANITIDINE 150 MG/1
150 TABLET, FILM COATED ORAL
Qty: 30 TAB | Refills: 4 | Status: SHIPPED | OUTPATIENT
Start: 2018-12-17 | End: 2019-02-08

## 2018-12-17 NOTE — PROGRESS NOTES
Feliciano Ards  Identified pt with two pt identifiers(name and ). Chief Complaint   Patient presents with    Blood Pressure Check       Reviewed record In preparation for visit and have obtained necessary documentation. Has info on advanced directive but has not filled them out. 1. Have you been to the ER, urgent care clinic or hospitalized since your last visit? UC Nov burned finger     2. Have you seen or consulted any other health care providers outside of the [facility] since your last visit? Include any pap smears or colon screening. No    Vitals reviewed with provider. Health Maintenance reviewed:   Pap Dr. Nadeen Kenny this past summer  Colonoscopy 2017    Health Maintenance Due   Topic    PAP AKA CERVICAL CYTOLOGY     Shingrix Vaccine Age 50> (1 of 2)    FOBT Q 1 YEAR AGE 50-75           Wt Readings from Last 3 Encounters:   18 159 lb (72.1 kg)   13 147 lb (66.7 kg)   06/15/12 142 lb (64.4 kg)        Temp Readings from Last 3 Encounters:   18 98.2 °F (36.8 °C) (Oral)   06/15/12 97.9 °F (36.6 °C)   11 97.7 °F (36.5 °C)        BP Readings from Last 3 Encounters:   18 130/80   13 110/70   06/15/12 108/69        Pulse Readings from Last 3 Encounters:   18 76   13 85   06/15/12 82      There were no vitals filed for this visit. Learning Assessment:   :     No flowsheet data found. Depression Screening:   :       PHQ over the last two weeks 2018   Little interest or pleasure in doing things Not at all   Feeling down, depressed, irritable, or hopeless Not at all   Total Score PHQ 2 0        Fall Risk Assessment:   :     No flowsheet data found. Abuse Screening:   :     No flowsheet data found. ADL Screening:   :     No flowsheet data found.

## 2018-12-17 NOTE — PATIENT INSTRUCTIONS
Start Zantac 150mg at nighttime on an empty stomach. Learning About High Blood Pressure  What is high blood pressure? Blood pressure is a measure of how hard the blood pushes against the walls of your arteries. It's normal for blood pressure to go up and down throughout the day, but if it stays up, you have high blood pressure. Another name for high blood pressure is hypertension. Two numbers tell you your blood pressure. The first number is the systolic pressure. It shows how hard the blood pushes when your heart is pumping. The second number is the diastolic pressure. It shows how hard the blood pushes between heartbeats, when your heart is relaxed and filling with blood. A blood pressure of less than 120/80 (say \"120 over 80\") is ideal for an adult. High blood pressure is 130/80 or higher. You have high blood pressure if your top number is 130 or higher or your bottom number is 80 or higher, or both. What happens when you have high blood pressure? · Blood flows through your arteries with too much force. Over time, this damages the walls of your arteries. But you can't feel it. High blood pressure usually doesn't cause symptoms. · Fat and calcium start to build up in your arteries. This buildup is called plaque. Plaque makes your arteries narrower and stiffer. Blood can't flow through them as easily. · This lack of good blood flow starts to damage some of the organs in your body. This can lead to problems such as coronary artery disease and heart attack, heart failure, stroke, kidney failure, and eye damage. How can you prevent high blood pressure? · Stay at a healthy weight. · Try to limit how much sodium you eat to less than 2,300 milligrams (mg) a day. If you limit your sodium to 1,500 mg a day, you can lower your blood pressure even more. ? Buy foods that are labeled \"unsalted,\" \"sodium-free,\" or \"low-sodium. \" Foods labeled \"reduced-sodium\" and \"light sodium\" may still have too much sodium. ? Flavor your food with garlic, lemon juice, onion, vinegar, herbs, and spices instead of salt. Do not use soy sauce, steak sauce, onion salt, garlic salt, mustard, or ketchup on your food. ? Use less salt (or none) when recipes call for it. You can often use half the salt a recipe calls for without losing flavor. · Be physically active. Get at least 30 minutes of exercise on most days of the week. Walking is a good choice. You also may want to do other activities, such as running, swimming, cycling, or playing tennis or team sports. · Limit alcohol to 2 drinks a day for men and 1 drink a day for women. · Eat plenty of fruits, vegetables, and low-fat dairy products. Eat less saturated and total fats. How is high blood pressure treated? · Your doctor will suggest making lifestyle changes. For example, your doctor may ask you to eat healthy foods, quit smoking, lose extra weight, and be more active. · If lifestyle changes don't help enough, your doctor may recommend that you take medicine. · When blood pressure is very high, medicines are needed to lower it. Follow-up care is a key part of your treatment and safety. Be sure to make and go to all appointments, and call your doctor if you are having problems. It's also a good idea to know your test results and keep a list of the medicines you take. Where can you learn more? Go to http://leyda-beni.info/. Enter P501 in the search box to learn more about \"Learning About High Blood Pressure. \"  Current as of: December 6, 2017  Content Version: 11.8  © 5125-1093 [employer]. Care instructions adapted under license by [employer] (which disclaims liability or warranty for this information). If you have questions about a medical condition or this instruction, always ask your healthcare professional. [address] any warranty or liability for your use of this information.          Chest Pain: Care Instructions  Your Care Instructions    There are many things that can cause chest pain. Some are not serious and will get better on their own in a few days. But some kinds of chest pain need more testing and treatment. Your doctor may have recommended a follow-up visit in the next 8 to 12 hours. If you are not getting better, you may need more tests or treatment. Even though your doctor has released you, you still need to watch for any problems. The doctor carefully checked you, but sometimes problems can develop later. If you have new symptoms or if your symptoms do not get better, get medical care right away. If you have worse or different chest pain or pressure that lasts more than 5 minutes or you passed out (lost consciousness), call 911 or seek other emergency help right away. A medical visit is only one step in your treatment. Even if you feel better, you still need to do what your doctor recommends, such as going to all suggested follow-up appointments and taking medicines exactly as directed. This will help you recover and help prevent future problems. How can you care for yourself at home? · Rest until you feel better. · Take your medicine exactly as prescribed. Call your doctor if you think you are having a problem with your medicine. · Do not drive after taking a prescription pain medicine. When should you call for help? Call 911 if:    · You passed out (lost consciousness).     · You have severe difficulty breathing.     · You have symptoms of a heart attack. These may include:  ? Chest pain or pressure, or a strange feeling in your chest.  ? Sweating. ? Shortness of breath. ? Nausea or vomiting. ? Pain, pressure, or a strange feeling in your back, neck, jaw, or upper belly or in one or both shoulders or arms. ? Lightheadedness or sudden weakness. ? A fast or irregular heartbeat. After you call 911, the  may tell you to chew 1 adult-strength or 2 to 4 low-dose aspirin.  Wait for an ambulance. Do not try to drive yourself.    Call your doctor today if:    · You have any trouble breathing.     · Your chest pain gets worse.     · You are dizzy or lightheaded, or you feel like you may faint.     · You are not getting better as expected.     · You are having new or different chest pain. Where can you learn more? Go to http://leyda-beni.info/. Enter A120 in the search box to learn more about \"Chest Pain: Care Instructions. \"  Current as of: November 20, 2017  Content Version: 11.8  © 5715-4704 [employer]. Care instructions adapted under license by [employer] (which disclaims liability or warranty for this information). If you have questions about a medical condition or this instruction, always ask your healthcare professional. [address] any warranty or liability for your use of this information. Chest Pain: Care Instructions  Your Care Instructions    There are many things that can cause chest pain. Some are not serious and will get better on their own in a few days. But some kinds of chest pain need more testing and treatment. Your doctor may have recommended a follow-up visit in the next 8 to 12 hours. If you are not getting better, you may need more tests or treatment. Even though your doctor has released you, you still need to watch for any problems. The doctor carefully checked you, but sometimes problems can develop later. If you have new symptoms or if your symptoms do not get better, get medical care right away. If you have worse or different chest pain or pressure that lasts more than 5 minutes or you passed out (lost consciousness), call 911 or seek other emergency help right away. A medical visit is only one step in your treatment.  Even if you feel better, you still need to do what your doctor recommends, such as going to all suggested follow-up appointments and taking medicines exactly as directed. This will help you recover and help prevent future problems. How can you care for yourself at home? · Rest until you feel better. · Take your medicine exactly as prescribed. Call your doctor if you think you are having a problem with your medicine. · Do not drive after taking a prescription pain medicine. When should you call for help? Call 911 if:    · You passed out (lost consciousness).     · You have severe difficulty breathing.     · You have symptoms of a heart attack. These may include:  ? Chest pain or pressure, or a strange feeling in your chest.  ? Sweating. ? Shortness of breath. ? Nausea or vomiting. ? Pain, pressure, or a strange feeling in your back, neck, jaw, or upper belly or in one or both shoulders or arms. ? Lightheadedness or sudden weakness. ? A fast or irregular heartbeat. After you call 911, the  may tell you to chew 1 adult-strength or 2 to 4 low-dose aspirin. Wait for an ambulance. Do not try to drive yourself.    Call your doctor today if:    · You have any trouble breathing.     · Your chest pain gets worse.     · You are dizzy or lightheaded, or you feel like you may faint.     · You are not getting better as expected.     · You are having new or different chest pain. Where can you learn more? Go to http://leyda-beni.info/. Enter A120 in the search box to learn more about \"Chest Pain: Care Instructions. \"  Current as of: November 20, 2017  Content Version: 11.8  © 0912-0430 [employer]. Care instructions adapted under license by [employer] (which disclaims liability or warranty for this information). If you have questions about a medical condition or this instruction, always ask your healthcare professional. [address] any warranty or liability for your use of this information.

## 2019-01-02 DIAGNOSIS — I10 ESSENTIAL HYPERTENSION: ICD-10-CM

## 2019-01-02 RX ORDER — AMLODIPINE BESYLATE 5 MG/1
5 TABLET ORAL DAILY
Qty: 90 TAB | Refills: 1 | Status: SHIPPED | OUTPATIENT
Start: 2019-01-02 | End: 2019-02-08 | Stop reason: ALTCHOICE

## 2019-02-08 ENCOUNTER — OFFICE VISIT (OUTPATIENT)
Dept: INTERNAL MEDICINE CLINIC | Facility: CLINIC | Age: 54
End: 2019-02-08

## 2019-02-08 VITALS
RESPIRATION RATE: 16 BRPM | TEMPERATURE: 98.1 F | BODY MASS INDEX: 22.96 KG/M2 | WEIGHT: 155 LBS | HEIGHT: 69 IN | OXYGEN SATURATION: 98 % | DIASTOLIC BLOOD PRESSURE: 76 MMHG | SYSTOLIC BLOOD PRESSURE: 110 MMHG | HEART RATE: 98 BPM

## 2019-02-08 DIAGNOSIS — I10 ESSENTIAL HYPERTENSION: ICD-10-CM

## 2019-02-08 DIAGNOSIS — H53.8 BLURRED VISION: Primary | ICD-10-CM

## 2019-02-08 NOTE — PATIENT INSTRUCTIONS
DASH Diet: Care Instructions Your Care Instructions The DASH diet is an eating plan that can help lower your blood pressure. DASH stands for Dietary Approaches to Stop Hypertension. Hypertension is high blood pressure. The DASH diet focuses on eating foods that are high in calcium, potassium, and magnesium. These nutrients can lower blood pressure. The foods that are highest in these nutrients are fruits, vegetables, low-fat dairy products, nuts, seeds, and legumes. But taking calcium, potassium, and magnesium supplements instead of eating foods that are high in those nutrients does not have the same effect. The DASH diet also includes whole grains, fish, and poultry. The DASH diet is one of several lifestyle changes your doctor may recommend to lower your high blood pressure. Your doctor may also want you to decrease the amount of sodium in your diet. Lowering sodium while following the DASH diet can lower blood pressure even further than just the DASH diet alone. Follow-up care is a key part of your treatment and safety. Be sure to make and go to all appointments, and call your doctor if you are having problems. It's also a good idea to know your test results and keep a list of the medicines you take. How can you care for yourself at home? Following the DASH diet · Eat 4 to 5 servings of fruit each day. A serving is 1 medium-sized piece of fruit, ½ cup chopped or canned fruit, 1/4 cup dried fruit, or 4 ounces (½ cup) of fruit juice. Choose fruit more often than fruit juice. · Eat 4 to 5 servings of vegetables each day. A serving is 1 cup of lettuce or raw leafy vegetables, ½ cup of chopped or cooked vegetables, or 4 ounces (½ cup) of vegetable juice. Choose vegetables more often than vegetable juice. · Get 2 to 3 servings of low-fat and fat-free dairy each day. A serving is 8 ounces of milk, 1 cup of yogurt, or 1 ½ ounces of cheese. · Eat 6 to 8 servings of grains each day. A serving is 1 slice of bread, 1 ounce of dry cereal, or ½ cup of cooked rice, pasta, or cooked cereal. Try to choose whole-grain products as much as possible. · Limit lean meat, poultry, and fish to 2 servings each day. A serving is 3 ounces, about the size of a deck of cards. · Eat 4 to 5 servings of nuts, seeds, and legumes (cooked dried beans, lentils, and split peas) each week. A serving is 1/3 cup of nuts, 2 tablespoons of seeds, or ½ cup of cooked beans or peas. · Limit fats and oils to 2 to 3 servings each day. A serving is 1 teaspoon of vegetable oil or 2 tablespoons of salad dressing. · Limit sweets and added sugars to 5 servings or less a week. A serving is 1 tablespoon jelly or jam, ½ cup sorbet, or 1 cup of lemonade. · Eat less than 2,300 milligrams (mg) of sodium a day. If you limit your sodium to 1,500 mg a day, you can lower your blood pressure even more. Tips for success · Start small. Do not try to make dramatic changes to your diet all at once. You might feel that you are missing out on your favorite foods and then be more likely to not follow the plan. Make small changes, and stick with them. Once those changes become habit, add a few more changes. · Try some of the following: ? Make it a goal to eat a fruit or vegetable at every meal and at snacks. This will make it easy to get the recommended amount of fruits and vegetables each day. ? Try yogurt topped with fruit and nuts for a snack or healthy dessert. ? Add lettuce, tomato, cucumber, and onion to sandwiches. ? Combine a ready-made pizza crust with low-fat mozzarella cheese and lots of vegetable toppings. Try using tomatoes, squash, spinach, broccoli, carrots, cauliflower, and onions. ? Have a variety of cut-up vegetables with a low-fat dip as an appetizer instead of chips and dip. ? Sprinkle sunflower seeds or chopped almonds over salads.  Or try adding chopped walnuts or almonds to cooked vegetables. ? Try some vegetarian meals using beans and peas. Add garbanzo or kidney beans to salads. Make burritos and tacos with mashed evans beans or black beans. Where can you learn more? Go to http://leyda-beni.info/. Enter X175 in the search box to learn more about \"DASH Diet: Care Instructions. \" Current as of: July 22, 2018 Content Version: 11.9 © 8066-8300 [employer]. Care instructions adapted under license by [employer] (which disclaims liability or warranty for this information). If you have questions about a medical condition or this instruction, always ask your healthcare professional. [address] any warranty or liability for your use of this information. Low Sodium Diet (2,000 Milligram): Care Instructions Your Care Instructions Too much sodium causes your body to hold on to extra water. This can raise your blood pressure and force your heart and kidneys to work harder. In very serious cases, this could cause you to be put in the hospital. It might even be life-threatening. By limiting sodium, you will feel better and lower your risk of serious problems. The most common source of sodium is salt. People get most of the salt in their diet from canned, prepared, and packaged foods. Fast food and restaurant meals also are very high in sodium. Your doctor will probably limit your sodium to less than 2,000 milligrams (mg) a day. This limit counts all the sodium in prepared and packaged foods and any salt you add to your food. Follow-up care is a key part of your treatment and safety. Be sure to make and go to all appointments, and call your doctor if you are having problems. It's also a good idea to know your test results and keep a list of the medicines you take. How can you care for yourself at home? Read food labels · Read labels on cans and food packages. The labels tell you how much sodium is in each serving. Make sure that you look at the serving size. If you eat more than the serving size, you have eaten more sodium. · Food labels also tell you the Percent Daily Value for sodium. Choose products with low Percent Daily Values for sodium. · Be aware that sodium can come in forms other than salt, including monosodium glutamate (MSG), sodium citrate, and sodium bicarbonate (baking soda). MSG is often added to Asian food. When you eat out, you can sometimes ask for food without MSG or added salt. Buy low-sodium foods · Buy foods that are labeled \"unsalted\" (no salt added), \"sodium-free\" (less than 5 mg of sodium per serving), or \"low-sodium\" (less than 140 mg of sodium per serving). Foods labeled \"reduced-sodium\" and \"light sodium\" may still have too much sodium. Be sure to read the label to see how much sodium you are getting. · Buy fresh vegetables, or frozen vegetables without added sauces. Buy low-sodium versions of canned vegetables, soups, and other canned goods. Prepare low-sodium meals · Cut back on the amount of salt you use in cooking. This will help you adjust to the taste. Do not add salt after cooking. One teaspoon of salt has about 2,300 mg of sodium. · Take the salt shaker off the table. · Flavor your food with garlic, lemon juice, onion, vinegar, herbs, and spices. Do not use soy sauce, lite soy sauce, steak sauce, onion salt, garlic salt, celery salt, mustard, or ketchup on your food. · Use low-sodium salad dressings, sauces, and ketchup. Or make your own salad dressings and sauces without adding salt. · Use less salt (or none) when recipes call for it. You can often use half the salt a recipe calls for without losing flavor. Other foods such as rice, pasta, and grains do not need added salt. · Rinse canned vegetables, and cook them in fresh water. This removes somebut not allof the salt. · Avoid water that is naturally high in sodium or that has been treated with water softeners, which add sodium. Call your local water company to find out the sodium content of your water supply. If you buy bottled water, read the label and choose a sodium-free brand. Avoid high-sodium foods · Avoid eating: 
? Smoked, cured, salted, and canned meat, fish, and poultry. ? Ham, masterson, hot dogs, and luncheon meats. ? Regular, hard, and processed cheese and regular peanut butter. ? Crackers with salted tops, and other salted snack foods such as pretzels, chips, and salted popcorn. ? Frozen prepared meals, unless labeled low-sodium. ? Canned and dried soups, broths, and bouillon, unless labeled sodium-free or low-sodium. ? Canned vegetables, unless labeled sodium-free or low-sodium. ? [place] fries, pizza, tacos, and other fast foods. ? Pickles, olives, ketchup, and other condiments, especially soy sauce, unless labeled sodium-free or low-sodium. Where can you learn more? Go to http://leyda-beni.info/. Enter A889 in the search box to learn more about \"Low Sodium Diet (2,000 Milligram): Care Instructions. \" Current as of: March 28, 2018 Content Version: 11.9 © 4878-7682 [employer]. Care instructions adapted under license by [employer] (which disclaims liability or warranty for this information). If you have questions about a medical condition or this instruction, always ask your healthcare professional. [place] [address] any warranty or liability for your use of this information.

## 2019-02-08 NOTE — PROGRESS NOTES
CC:  
Chief Complaint Patient presents with  
 Other  
  blurry vision -saw eyedoctor Dr Venessa Tam yesterday  Sinus Pain  
  swollen lymph nodes in neck HISTORY OF PRESENT ILLNESS Ole Alas is a 47 y.o. female. Patient complains of blurred vision over the past 1-2 months for which she saw Dr. Venessa Tam (Optometry) yesterday. Blurred vision has made it harder to see the keyboard on her cell phone. Had normal eye exam.  When asked about new medications, told Dr. Venessa Tam that she started amlodipine in December. Was told that an uncommon but possible side effect of amlodipine was blurred vision. Was told she had mildly swollen lymph nodes by her dentist last week. Has mild right facial pain. Denies tooth pain, fevers, chills, sore throat, ear pain, or cough. Patient Active Problem List  
Diagnosis Code  Migraine G43.909  Essential hypertension I10 Past Medical History:  
Diagnosis Date  Migraine Allergies Allergen Reactions  Adhesive Hives Adhesive tapes  Sulfa (Sulfonamide Antibiotics) Nausea and Vomiting  Topamax [Topiramate] Nausea and Vomiting Current Outpatient Medications Medication Sig Dispense Refill  amLODIPine (NORVASC) 5 mg tablet Take 1 Tab by mouth daily. 90 Tab 1 PHYSICAL EXAM 
Visit Vitals /76 (BP 1 Location: Left arm, BP Patient Position: Sitting) Pulse 98 Temp 98.1 °F (36.7 °C) (Oral) Resp 16 Ht 5' 9\" (1.753 m) Wt 155 lb (70.3 kg) SpO2 98% BMI 22.89 kg/m² General: Pleasant, well-developed and well-nourished, no distress. HEENT:  Head normocephalic/atraumatic, no scleral icterus. No sinus tenderness. Oropharynx benign. Lungs:  Clear to ausculation bilaterally. Good air movement. Heart:  Regular rate and rhythm, normal S1 and S2, no murmur, gallop, or rub Extremities: No clubbing, cyanosis, or edema. Neurological: Alert and oriented. Psychiatric: Normal mood and affect.  Behavior is normal.  
 
 
 
 ASSESSMENT AND PLAN 
  ICD-10-CM ICD-9-CM 1. Blurred vision H53.8 368.8 2. Essential hypertension I10 401.9 Diagnoses and all orders for this visit: 1. Blurred vision Possibly due to amlodipine. Review of medical literature confirms visual disturbance as an uncommon side effect of amlodipine. Stop amlodipine. 2. Essential hypertension Patient would like a trial of lifestyle modification. Review of her BP readings from previous visits show mild elevation in BP so will allow her this opportunity. Provided her information on DASH diet and low sodium diet. Counseled on exercise. Follow up with her PCP in 4 months. Follow-up Disposition: 
Return if symptoms worsen or fail to improve, for Scheduled appointment with Maisha Mckeon NP on  6/3/19. I have discussed the diagnosis with the patient and the intended plan as seen in the above orders. Patient is in agreement. The patient has received an after-visit summary and questions were answered concerning future plans. I have discussed medication side effects and warnings with the patient as well.

## 2019-02-08 NOTE — PROGRESS NOTES
Lizette Ramosjessica  Identified pt with two pt identifiers(name and ). Chief Complaint Patient presents with  
 Other  
  blurry vision -saw eyedoctor Dr Radha Escobedo yesterday  Sinus Pain  
  swollen lymph nodes in neck 1. Have you been to the ER, urgent care clinic since your last visit? Hospitalized since your last visit? NO 
 
2. Have you seen or consulted any other health care providers outside of the [address] [place] [address] since your last visit? Include any pap smears or colon screening. Dr Radha Escobedo and the dentist  
 
Today's provider has been notified of reason for visit, vitals and flowsheets obtained on patients. Patient received paperwork for advance directive during previous visit but has not completed at this time Reviewed record In preparation for visit, huddled with provider and have obtained necessary documentation Health Maintenance Due Topic  PAP AKA CERVICAL CYTOLOGY  Shingrix Vaccine Age 50> (1 of 2)  FOBT Q 1 YEAR AGE 50-75 Wt Readings from Last 3 Encounters:  
19 155 lb (70.3 kg) 18 161 lb (73 kg) 18 159 lb (72.1 kg) Temp Readings from Last 3 Encounters:  
19 98.1 °F (36.7 °C) (Oral) 18 97.9 °F (36.6 °C) (Oral) 18 98.2 °F (36.8 °C) (Oral) BP Readings from Last 3 Encounters:  
19 110/76  
18 120/70  
18 130/80 Pulse Readings from Last 3 Encounters:  
19 98  
18 79  
18 76 Vitals:  
 19 1059 BP: 110/76 Pulse: 98 Resp: 16 Temp: 98.1 °F (36.7 °C) TempSrc: Oral  
SpO2: 98% Weight: 155 lb (70.3 kg) Height: 5' 9\" (1.753 m) PainSc:   0 - No pain Learning Assessment: 
:  
 
No flowsheet data found. Depression Screening: 
:  
 
PHQ over the last two weeks 2019 Little interest or pleasure in doing things Not at all Feeling down, depressed, irritable, or hopeless Not at all Total Score PHQ 2 0 Fall Risk Assessment: 
:  
 
 No flowsheet data found. Abuse Screening: 
:  
 
No flowsheet data found. ADL Screening: 
:  
 
ADL Assessment 2/8/2019 Feeding yourself No Help Needed Getting from bed to chair No Help Needed Getting dressed No Help Needed Bathing or showering No Help Needed Walk across the room (includes cane/walker) No Help Needed Using the telphone No Help Needed Taking your medications No Help Needed Preparing meals No Help Needed Managing money (expenses/bills) No Help Needed Moderately strenuous housework (laundry) No Help Needed Shopping for personal items (toiletries/medicines) No Help Needed Shopping for groceries No Help Needed Driving No Help Needed Climbing a flight of stairs No Help Needed Getting to places beyond walking distances No Help Needed Medication reconciliation up to date and corrected with patient at this time.

## 2019-04-23 ENCOUNTER — TELEPHONE (OUTPATIENT)
Dept: INTERNAL MEDICINE CLINIC | Facility: CLINIC | Age: 54
End: 2019-04-23

## 2019-04-23 RX ORDER — AMLODIPINE BESYLATE 5 MG/1
TABLET ORAL
COMMUNITY
Start: 2019-02-05 | End: 2021-08-25

## 2019-04-23 NOTE — TELEPHONE ENCOUNTER
Pt states Dr. Babak Mullins is request her medication list be faxed to them at [phone]  Pt was made aware appears to only be one medication and she confirmed that's correct and they would still like it faxed to them  May be reached at [phone] for further comment

## 2020-03-04 ENCOUNTER — OFFICE VISIT (OUTPATIENT)
Dept: NEUROLOGY | Age: 55
End: 2020-03-04

## 2020-03-04 VITALS
SYSTOLIC BLOOD PRESSURE: 118 MMHG | HEIGHT: 69 IN | HEART RATE: 88 BPM | OXYGEN SATURATION: 100 % | DIASTOLIC BLOOD PRESSURE: 80 MMHG | WEIGHT: 146 LBS | BODY MASS INDEX: 21.62 KG/M2

## 2020-03-04 DIAGNOSIS — I10 ESSENTIAL HYPERTENSION: Primary | ICD-10-CM

## 2020-03-04 DIAGNOSIS — E03.9 ACQUIRED HYPOTHYROIDISM: ICD-10-CM

## 2020-03-04 DIAGNOSIS — R41.3 MEMORY LOSS: ICD-10-CM

## 2020-03-04 DIAGNOSIS — E53.8 B12 DEFICIENCY: ICD-10-CM

## 2020-03-04 NOTE — PROGRESS NOTES
NEUROLOGY HISTORY AND PHYSICAL    Name Melvina Cornejo Age 49 y.o. MRN [id number]  1965     Referring Physician: Amalia Narvaez NP      Chief Complaint:  Brain fog     This is a 49 y.o. right handed female with a medical history of hypertension. She is here complaining of brain fog. She is having trouble focusing and dealing with things that she normally does. She went back to work to see if that would get her more organized and she found that this was difficult for her. She has a background in IT an data systems and she can't imagine doing this again. There is no pain or aches . She stopped having her menstrual cycle . She did not have any symptoms during the menopause. She lost her daughter around that time to cancer. Her sleep is improving. She feels that she is getting worse with regards to memory She did have an maternal uncle who suffered Alzheimer as well as her mother. Both were diagnosed over the age of 61 . She does not drink. She feels that she drinks excessively and follows a healthy diet. She has not had any weight loss ther    Assessment and Plan  1. Essential hypertension  - MRI BRAIN WO CONT; Future    2. Memory loss  - REFERRAL TO NEUROPSYCHOLOGY    3. Acquired hypothyroidism  - TSH 3RD GENERATION    4. B12 deficiency  - VITAMIN B12        Allergies   Allergen Reactions    Adhesive Hives     Adhesive tapes    Sulfa (Sulfonamide Antibiotics) Nausea and Vomiting    Topamax [Topiramate] Nausea and Vomiting           Current Outpatient Medications   Medication Sig    amLODIPine (NORVASC) 5 mg tablet      No current facility-administered medications for this visit. Past Medical History:   Diagnosis Date    Essential hypertension     Migraine         Social History     Tobacco Use    Smoking status: Never Smoker    Smokeless tobacco: Never Used   Substance Use Topics    Alcohol use:  Yes     Alcohol/week: 2.0 standard drinks     Types: 2 Glasses of wine per week Comment: 2-4/wk    Drug use: No      Family History   Problem Relation Age of Onset   Savita Little Stroke Mother     Heart Disease Mother     Hypertension Mother     Cancer Father         prostate    Cancer Brother         prostate    Hypertension Brother     Heart Disease Maternal Grandmother     Stroke Maternal Grandmother     Heart Disease Maternal Grandfather     Cancer Other         rhabdosarcoma       Review of Systems   Constitutional: Negative for chills and fever. HENT: Negative for ear pain. Eyes: Negative for pain and discharge. Respiratory: Negative for cough and hemoptysis. Cardiovascular: Negative for chest pain and claudication. Gastrointestinal: Negative for constipation and diarrhea. Genitourinary: Negative for flank pain and hematuria. Musculoskeletal: Negative for back pain and myalgias. Skin: Negative for itching and rash. Neurological: Negative for headaches. Endo/Heme/Allergies: Negative for environmental allergies. Does not bruise/bleed easily. Psychiatric/Behavioral: Positive for memory loss. Negative for depression and hallucinations. Exam  Visit Vitals  Ht 5' 9\" (1.753 m)   BMI 22.89 kg/m²         General: Well developed, well nourished. Patient in no apparent distress   Head: Normocephalic, atraumatic, anicteric sclera   Neck Normal ROM, No thyromegally   Lungs:  Clear to auscultation bilaterally, No wheezes or rubs   Cardiac: Regular rate and rhythm with no murmurs. Abd: Bowel sounds were audible. No tenderness on palpation   Ext: No pedal edema   Skin: Supple no rash     NeurologicExam:  Mental Status: Alert and oriented to person place and time   Speech: Fluent no aphasia or dysarthria. Cranial Nerves:   II-XII Intact   . fundoibz   Motor:  Full and symmetric strength of upper and lower ext . Normal bulk and tone. Reflexes:   Deep tendon reflexes 2+/4 and symmetric.    Sensory:   Symmetric and intact    Gait:  Gait is balanced    Tremor:   No tremor noted.   Cerebellar:  Coordination intact. Neurovascular: No carotid bruits.  No JVD      Lab Review  Lab Results   Component Value Date/Time    WBC 5.2 04/07/2011 12:00 PM    HCT 38.7 04/07/2011 12:00 PM    HGB 13.8 04/07/2011 12:00 PM    PLATELET 712 05/55/2613 12:00 PM     Lab Results   Component Value Date/Time    Sodium 141 10/09/2018 09:04 AM    Potassium 4.1 10/09/2018 09:04 AM    Chloride 102 10/09/2018 09:04 AM    CO2 23 10/09/2018 09:04 AM    Glucose 95 10/09/2018 09:04 AM    BUN 14 10/09/2018 09:04 AM    Creatinine 0.92 10/09/2018 09:04 AM    Calcium 9.5 10/09/2018 09:04 AM       Lab Results   Component Value Date/Time    LDL, calculated 114 (H) 10/09/2018 09:04 AM

## 2020-03-05 LAB
TSH SERPL DL<=0.005 MIU/L-ACNC: 2.53 UIU/ML (ref 0.45–4.5)
VIT B12 SERPL-MCNC: 330 PG/ML (ref 232–1245)

## 2020-03-09 ENCOUNTER — HOSPITAL ENCOUNTER (OUTPATIENT)
Dept: MRI IMAGING | Age: 55
Discharge: HOME | End: 2020-03-09
Attending: PSYCHIATRY & NEUROLOGY
Payer: COMMERCIAL

## 2020-03-09 DIAGNOSIS — R41.3 MEMORY LOSS: ICD-10-CM

## 2020-03-09 PROCEDURE — 70551 MRI BRAIN STEM W/O DYE: CPT

## 2020-03-09 NOTE — PROGRESS NOTES
Will discuss next appointment  Please make sure they have an appointment after seeing Dr. Ritchie Olea

## 2020-03-17 ENCOUNTER — TELEPHONE (OUTPATIENT)
Dept: NEUROLOGY | Age: 55
End: 2020-03-17

## 2020-03-19 ENCOUNTER — OFFICE VISIT (OUTPATIENT)
Dept: NEUROLOGY | Age: 55
End: 2020-03-19

## 2020-03-19 VITALS
OXYGEN SATURATION: 100 % | HEIGHT: 69 IN | DIASTOLIC BLOOD PRESSURE: 68 MMHG | BODY MASS INDEX: 21.48 KG/M2 | HEART RATE: 64 BPM | WEIGHT: 145 LBS | SYSTOLIC BLOOD PRESSURE: 114 MMHG

## 2020-03-19 DIAGNOSIS — R41.3 MEMORY LOSS: Primary | ICD-10-CM

## 2020-03-19 DIAGNOSIS — I10 ESSENTIAL HYPERTENSION: ICD-10-CM

## 2020-03-19 RX ORDER — MEMANTINE HYDROCHLORIDE 5 MG/1
5 TABLET ORAL 2 TIMES DAILY
Qty: 60 TAB | Refills: 3 | Status: SHIPPED | OUTPATIENT
Start: 2020-03-19 | End: 2020-03-30 | Stop reason: SDUPTHER

## 2020-03-19 RX ORDER — SILDENAFIL 25 MG/1
25 TABLET, FILM COATED ORAL AS NEEDED
Qty: 30 TAB | Refills: 3 | Status: SHIPPED | OUTPATIENT
Start: 2020-03-19 | End: 2020-04-15 | Stop reason: SDUPTHER

## 2020-03-19 NOTE — PROGRESS NOTES
Follow-up Visit    Name Derral Mortimer Age 49 y.o. MRN [id number]  1965       Chief Complaint: memory loss    Returns for follow up. Discussed memory loss. Discussed brain atrophy and compared the MRI to the CT in . We discussed the effects of anxiety and stress on the brain. I reassured her that she doesn't meet the criteria for dementia but I was concerned enough about her symptoms the I would refer her to for neurosych evaluation and start treatment pending the results of this evaluation. We also briefly looked at available studies that may show some hope for treatment. Assesment and Plan  1. Memory loss  Medication, rationale, route of administration, adverse reactions and alternatives were discussed in detail and all questions were answered. - sildenafil citrate (Viagra) 25 mg tablet; Take 1 Tab by mouth as needed for Erectile Dysfunction. Dispense: 30 Tab; Refill: 3  - memantine (NAMENDA) 5 mg tablet; Take 1 Tab by mouth two (2) times a day. Dispense: 60 Tab; Refill: 3  - REFERRAL TO NEUROLOGY    2. Essential Hypertension  Continue amlodipine    Allergies  Adhesive; Sulfa (sulfonamide antibiotics); and Topamax [topiramate]     Medications  Current Outpatient Medications   Medication Sig    amLODIPine (NORVASC) 5 mg tablet      No current facility-administered medications for this visit. Medical History  Past Medical History:   Diagnosis Date    Essential hypertension     Migraine        Review of Systems   Constitutional: Negative for chills and fever. HENT: Negative for ear pain. Eyes: Negative for pain and discharge. Respiratory: Negative for cough and hemoptysis. Cardiovascular: Negative for chest pain and claudication. Gastrointestinal: Negative for constipation and diarrhea. Genitourinary: Negative for flank pain and hematuria. Musculoskeletal: Negative for back pain and myalgias. Skin: Negative for itching and rash.    Neurological: Negative for headaches. Endo/Heme/Allergies: Negative for environmental allergies. Does not bruise/bleed easily. Psychiatric/Behavioral: Positive for depression and memory loss. Negative for hallucinations. Exam:  Visit Vitals  /68   Pulse 64   Ht 5' 9\" (1.753 m)   Wt 145 lb (65.8 kg)   SpO2 100%   BMI 21.41 kg/m²        General: Well developed, well nourished. Patient in no apparent distress   Head: Normocephalic, atraumatic, anicteric sclera   Neck Normal ROM, No thyromegally   Cardiac: Regular rate and rhythm   Ext: No pedal edema   Skin: Supple no rash     NeurologicExam:  Mental Status: Alert and oriented to person place and time   Speech: Fluent no aphasia or dysarthria. Cranial Nerves:  II - XII Intact   Motor:  Full and symmetric strength . Normal bulk and tone. Sensory:   Symmetric and intact    Gait:  Gait is balanced and fluid with normal arm swing. Tremor:   No tremor noted. Cerebellar:  Coordination intact.         Lab Review  Lab Results   Component Value Date/Time    WBC 5.2 04/07/2011 12:00 PM    HCT 38.7 04/07/2011 12:00 PM    HGB 13.8 04/07/2011 12:00 PM    PLATELET 671 96/31/3776 12:00 PM       Lab Results   Component Value Date/Time    Sodium 141 10/09/2018 09:04 AM    Potassium 4.1 10/09/2018 09:04 AM    Chloride 102 10/09/2018 09:04 AM    CO2 23 10/09/2018 09:04 AM    Glucose 95 10/09/2018 09:04 AM    BUN 14 10/09/2018 09:04 AM    Creatinine 0.92 10/09/2018 09:04 AM    Calcium 9.5 10/09/2018 09:04 AM         Lab Results   Component Value Date/Time    Vitamin B12 330 03/04/2020 09:22 AM       Lab Results   Component Value Date/Time    Cholesterol, total 194 10/09/2018 09:04 AM    HDL Cholesterol 62 10/09/2018 09:04 AM    LDL, calculated 114 (H) 10/09/2018 09:04 AM    VLDL, calculated 18 10/09/2018 09:04 AM    Triglyceride 88 10/09/2018 09:04 AM

## 2020-03-30 DIAGNOSIS — R41.3 MEMORY LOSS: ICD-10-CM

## 2020-03-30 RX ORDER — MEMANTINE HYDROCHLORIDE 5 MG/1
10 TABLET ORAL 2 TIMES DAILY
Qty: 180 TAB | Refills: 3 | Status: SHIPPED | OUTPATIENT
Start: 2020-03-30 | End: 2020-08-10

## 2020-04-15 ENCOUNTER — VIRTUAL VISIT (OUTPATIENT)
Dept: NEUROLOGY | Age: 55
End: 2020-04-15

## 2020-04-15 DIAGNOSIS — R41.3 MEMORY LOSS: ICD-10-CM

## 2020-04-15 DIAGNOSIS — F03.91 MAJOR NEUROCOGNITIVE DISORDER DUE TO ALZHEIMER'S DISEASE, PROBABLE, WITH BEHAVIORAL DISTURBANCE: Primary | ICD-10-CM

## 2020-04-15 NOTE — PROGRESS NOTES
This note will not be viewable in [address] Pursuant to the emergency declaration under the [facility] waiver authority and the [employer] Act, this Virtual Visit was conducted, with appropriate consent obtained, to reduce the patient's risk of exposure to COVID-19 and provide continuity of care   Services were provided in this manner to substitute for in-person clinic visit. The originating site is the patient's home and the distance site is [facility]. These types of teleneuropsychology/telehealth/telemedicine visits were authorized by the President of the [employer], though I/we cannot guarantee what a third party payor will do reimbursement/coverage wise. I indicated that I would evaluate the patient and recommend diagnostics and treatment based on my assessment and impressions, and that our sessions are not being recorded and that personal health information is protected to the best of our abilities. [facility] at [address]    Office:  [phone]  Fax: [phone]                 Initial Office Exam    Patient Name: Erica Oliveros  Age: 49 y.o. Gender: female   Occupation: IT/ unemployed  Handedness: right handed   Presenting Concern: Memory loss/brain fog  Primary Care Physician: Aliza Ordonez MD  Referring Provider:  Glenn Boyd MD      REASON FOR REFERRAL:  This comprehensive and medically necessary neuropsychological assessment was requested to assist with a differential diagnosis of dementia.   The use and purpose of this examination, as well as the extent and limitations of confidentiality, were explained prior to obtaining permission to participate. Instructions were provided regarding the necessity to put forth optimal effort and answer questions truthfully in order to obtain reliable and accurate test results. PERTINENT HISTORY:  Ms. Hannah Barrow presented for a neuropsychological assessment at the recommendation of her treating physician secondary to complaints of memory loss. Visuospatial/perception that is significantly impacting her parking ability, quilt making and other crafty work. She is not reporting symptoms of anxiety or depression at this time. Ms. Hannah Barrow began noticing symptoms starting 2 years ago. There is mild parietal predominant cerebral atrophy noted on recent MRI study of the brain. From a brief review of her medical and personal history there has not been any other significant neurological injury or illness noted or reported. She did not report experiencing depression or anxiety in the past.      Ms. Hannah Barrow does  report any problems at birth or difficulties meeting developmental milestones. She reports that she had an adequate level of family support and was not subject to trauma or abuse as a child. Ms. Hannah Barrow does not  report being retain in school or receiving special assistance in any of she classes or subjects. Ms. 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 She is currently drinking alcohol, but there is no report of smoking. Ms. Hannah Barrow does  exercise on a regular basis and does  maintain a balanced diet. She does not  report problems with sleep and does not  complain of pain. She does  participate in mentally stimulating activities. Ms. Hannah Barrow indicated that she is independent in her instrumental activities of daily living, including shopping, meal preparation, housekeeping, doing laundry, driving a car, managing medications, and finances.       Current Outpatient Medications   Medication Sig    memantine (NAMENDA) 5 mg tablet Take 2 Tabs by mouth two (2) times a day.    sildenafil citrate (Viagra) 25 mg tablet Take 1 Tab by mouth as needed for Erectile Dysfunction.  amLODIPine (NORVASC) 5 mg tablet      No current facility-administered medications for this visit. Past Medical History:   Diagnosis Date    Essential hypertension     Migraine        No flowsheet data found. No data recorded    Past Surgical History:   Procedure Laterality Date    HX  SECTION  , ,     HX MOHS PROCEDURES  2008    left    HX ORTHOPAEDIC Left     RCR    HX OTHER SURGICAL      Polyp removed        Social History     Socioeconomic History    Marital status:      Spouse name: Not on file    Number of children: Not on file    Years of education: Not on file    Highest education level: Not on file   Tobacco Use    Smoking status: Never Smoker    Smokeless tobacco: Never Used   Substance and Sexual Activity    Alcohol use: Yes     Alcohol/week: 2.0 standard drinks     Types: 2 Glasses of wine per week     Comment: 2-4/wk    Drug use: No    Sexual activity: Yes     Birth control/protection: None     Comment: last period    Social History Narrative    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 Previously served in [employer]. Family History   Problem Relation Age of Onset   Radha Salazar Stroke Mother     Heart Disease Mother     Hypertension Mother     Cancer Father         prostate    Cancer Brother         prostate    Hypertension Brother     Heart Disease Maternal Grandmother     Stroke Maternal Grandmother     Heart Disease Maternal Grandfather     Cancer Other         rhabdosarcoma       CT Results (most recent):  Results from Hospital Encounter encounter on 11   CT ABDOMEN PELVIS WITH CONTRAST    Narrative **Final Report**       ICD Codes / Adm. Diagnosis: 330569  25 / Flank Pain  Nausea  Examination:  CT ABD PELVIS W CON  - [id number] - 2011  3:07PM  Accession No:  [id number]  Reason:  abdominal pain      REPORT:  EXAM: CT ABDOMEN PELVIS WITH CONTRAST    INDICATION:  Flank pain and nausea. COMPARISON: None. CONTRAST: 100 mL of Optiray- 350. TECHNIQUE:   Multislice helical CT was performed from the diaphragm to the symphysis   pubis during uneventful rapid bolus intravenous contrast administration. Oral contrast was also administered. Delayed images of the abdomen were   acquired. Contiguous 5 mm axial images were reconstructed and lung and soft   tissue windows were generated. Coronal reformations were generated. CT dose   reduction was achieved through use of a standardized protocol tailored for   this examination and automatic exposure control for dose modulation. FINDINGS:  There are bilateral breast implants. The visualized portions of the lung   bases are clear. There are no focal abnormalities within the liver, spleen, pancreas, adrenal   glands or kidneys. The aorta  tapers without aneurysm. There is no retroperitoneal adenopathy   or mass. The bowel is normal.  The appendix is not identified but there is no   inflammation adjacent to the cecum. There is no ascites or free   intraperitoneal air. The uterus contains a tiny bit of fluid in the endometrium. There is trace   free fluid in the pelvic cul-de-sac which is probably physiologic. The left   gonadal (ovarian) vein is markedly enlarged and tortuous. There is no   pelvic mass or adenopathy. The delayed images demonstrate good bilateral excretion of contrast into the   renal collecting systems. IMPRESSION:   1. Trace free pelvic fluid probably physiologic. 2. Enlarged left ovarian vein. 3. Bilateral breast implants.             Interpreting/Reading Doctor: Sanam Degroot ([id number])  Transcribed:  on 04/07/2011  Approved: Sanam Degroot ([id number])  04/07/2011             Distribution:  Attending Doctor: Princess Johnson             MRI Results (most recent):  Results from Hospital Encounter encounter on 03/09/20   MRI BRAIN WO CONT Narrative CLINICAL HISTORY: Memory loss    INDICATION: memory loss. COMPARISON:    TECHNIQUE: MR examination of the brain includes axial and sagittal T1, coronal  T2, axial T2, axial FLAIR, axial gradient echo, axial DWI. CONTRAST: None    FINDINGS:   There is no intracranial mass, hemorrhage or evidence of acute infarction. Mild sulcal and ventricular prominence is parietal predominant. The brain architecture is normal. There is no evidence of midline shift or  mass-effect. There are no extra-axial fluid collections. There is no Chiari or  syrinx. The pituitary and infundibulum are grossly unremarkable. There is no  skull base mass. Cerebellopontine angles are grossly unremarkable. The major  intracranial vascular flow-voids are unremarkable. The cavernous sinuses are  symmetric. Optic chiasm and infundibulum grossly unremarkable. Orbits are  grossly symmetric. Dural venous sinuses are grossly patent. The mastoid air cells are well pneumatized and clear. Impression IMPRESSION:  Mild parietal predominant cerebral atrophy. Otherwise unremarkable MRI of the brain. No intracranial mass, hemorrhage or evidence of acute infarction. MENTAL STATUS:    Orientation: Fully oriented   Eye Contact: appropriate   Motor Behavior:  Ambulates independently   Speech:  Fluent and intelligible   Thought Process: Clear and coherent   Thought Content: No evidence of hallucinations or delusions   Suicidal ideations: Denies   Mood:  euthymic   Affect:  Normal range    Concentration:  mildly impaired   Abstraction:  WNL   Insight:  adequate     On the Modified Mini-Mental Status Exam: 78/100 (<1) impaired      DIAGNOSTIC IMPRESSIONS:    ICD-10-CM ICD-9-CM    1. Major neurocognitive disorder due to Alzheimer's disease, probable, with behavioral disturbance (HCC) G30.9 331.0 CA NEUROBEHAVIORAL STATUS XM PHYS/QHP EA ADDL HOUR    F02.81 294.9        PLAN:  1.  Complete a comprehensive neuropsychological assessment to provide a differential diagnosis of presenting concerns as well as to assist with disposition and treatment planning as appropriate. 2. Consider compensatory and remedial cognitive training. 3. Consider an adaptive driving evaluation. 4. Family consultation and education regarding dementia. 5. Consider referral for elder health nurse to provide an in-home functional assessment. 6. Consider placement issues to provide greater structure and supervision to ensure safety, health and well-being. 00582 x 1 Review of records. Face to face interview w/ patient. Determine test protocol: 60 minutes. Total 1 unit  21636 continuation of service      Adriana Rosado, PhD, ABPP, LCP  Licensed Clinical Psychologist/ Neuropsychologist        This note will not be viewable in [address]

## 2020-04-15 NOTE — TELEPHONE ENCOUNTER
Received refill request for [facility]il from [facility]  Patient ins plan only allows 8 per 24 days  Last filled 3/19/20  Last office visit with Felicia 3/19/20

## 2020-04-16 RX ORDER — SILDENAFIL 25 MG/1
25 TABLET, FILM COATED ORAL AS NEEDED
Qty: 8 TAB | Refills: 3 | Status: SHIPPED | OUTPATIENT
Start: 2020-04-16 | End: 2021-02-25

## 2020-05-14 ENCOUNTER — TELEPHONE (OUTPATIENT)
Dept: NEUROLOGY | Age: 55
End: 2020-05-14

## 2020-05-14 NOTE — TELEPHONE ENCOUNTER
----- Message from K[employer] sent at 5/14/2020  1:45 PM EDT -----  Regarding: Dr Stinson/Telephone  General Message/Vendor Calls    Caller's first and last name:      Reason for call: Pt has current appt scheduled for 06/04/20 at 8:00, but is requesting to reschedule to sometime after 06/08/20      Callback required yes/no and why: yes      Best contact number(s):[phone]      Details to clarify the request:      [employer]

## 2020-06-16 ENCOUNTER — TELEPHONE (OUTPATIENT)
Dept: NEUROLOGY | Age: 55
End: 2020-06-16

## 2020-06-16 NOTE — TELEPHONE ENCOUNTER
----- Message from Ismael Tomlin sent at 6/16/2020  8:52 AM EDT -----  Regarding: Dr. Benny Colvin  General Message/Vendor Calls    Caller's first and last name: Carolann Timmy      Reason for call:call back regarding appt      Callback required yes/no and why:yes      Best contact number(s):[phone]      Details to clarify the request: Pt requested a call regarding her appt for Wednesday 06/17/20 and was advised it would be a telemedicine appt.       Ismael Tomlin

## 2020-08-04 DIAGNOSIS — R41.3 MEMORY LOSS: ICD-10-CM

## 2020-08-06 NOTE — TELEPHONE ENCOUNTER
Grounding pad: Site left thigh  Skin Assessment pre-procedure WDL  Skin assessment post-procedure WDL      Placed by: RT Juve Em Temperature settings:  Cut 25 Sorto, Coag 25 Sorto           Spoke to patient she said she was sitting for 15-20 min prior to taking BP.   BP is running 130-150/  since seeing NP.

## 2020-08-10 RX ORDER — MEMANTINE HYDROCHLORIDE 5 MG/1
TABLET ORAL
Qty: 360 TAB | Refills: 3 | Status: SHIPPED | OUTPATIENT
Start: 2020-08-10 | End: 2021-02-25 | Stop reason: SDUPTHER

## 2021-02-25 ENCOUNTER — OFFICE VISIT (OUTPATIENT)
Dept: NEUROLOGY | Age: 56
End: 2021-02-25
Payer: COMMERCIAL

## 2021-02-25 VITALS
DIASTOLIC BLOOD PRESSURE: 88 MMHG | TEMPERATURE: 97.6 F | OXYGEN SATURATION: 100 % | SYSTOLIC BLOOD PRESSURE: 131 MMHG | HEIGHT: 69 IN | WEIGHT: 153 LBS | BODY MASS INDEX: 22.66 KG/M2 | HEART RATE: 83 BPM

## 2021-02-25 DIAGNOSIS — I10 ESSENTIAL HYPERTENSION: ICD-10-CM

## 2021-02-25 DIAGNOSIS — G30.0 EARLY ONSET ALZHEIMER'S DEMENTIA WITHOUT BEHAVIORAL DISTURBANCE (HCC): Primary | ICD-10-CM

## 2021-02-25 DIAGNOSIS — F02.80 EARLY ONSET ALZHEIMER'S DEMENTIA WITHOUT BEHAVIORAL DISTURBANCE (HCC): Primary | ICD-10-CM

## 2021-02-25 PROCEDURE — 99215 OFFICE O/P EST HI 40 MIN: CPT | Performed by: PSYCHIATRY & NEUROLOGY

## 2021-02-25 RX ORDER — MEMANTINE HYDROCHLORIDE 10 MG/1
10 TABLET ORAL 2 TIMES DAILY
Qty: 180 TAB | Refills: 3 | Status: SHIPPED | OUTPATIENT
Start: 2021-02-25 | End: 2021-08-25

## 2021-02-25 RX ORDER — MEMANTINE HYDROCHLORIDE 10 MG/1
10 TABLET ORAL 2 TIMES DAILY
Qty: 60 TAB | Refills: 4 | Status: SHIPPED | OUTPATIENT
Start: 2021-02-25 | End: 2021-09-07 | Stop reason: SDUPTHER

## 2021-02-25 RX ORDER — LOSARTAN POTASSIUM 50 MG/1
50 TABLET ORAL DAILY
Qty: 90 TAB | Refills: 3 | Status: SHIPPED | OUTPATIENT
Start: 2021-02-25 | End: 2021-08-25

## 2021-02-25 RX ORDER — GALANTAMINE HYDROBROMIDE 16 MG/1
16 CAPSULE, EXTENDED RELEASE ORAL
COMMUNITY
End: 2021-08-25 | Stop reason: SDUPTHER

## 2021-08-05 ENCOUNTER — TELEPHONE (OUTPATIENT)
Dept: NEUROLOGY | Age: 56
End: 2021-08-05

## 2021-08-25 ENCOUNTER — OFFICE VISIT (OUTPATIENT)
Dept: NEUROLOGY | Age: 56
End: 2021-08-25
Payer: COMMERCIAL

## 2021-08-25 VITALS
OXYGEN SATURATION: 95 % | SYSTOLIC BLOOD PRESSURE: 120 MMHG | RESPIRATION RATE: 17 BRPM | TEMPERATURE: 98.6 F | HEART RATE: 94 BPM | WEIGHT: 153 LBS | HEIGHT: 69 IN | DIASTOLIC BLOOD PRESSURE: 80 MMHG | BODY MASS INDEX: 22.66 KG/M2

## 2021-08-25 DIAGNOSIS — E03.9 ACQUIRED HYPOTHYROIDISM: ICD-10-CM

## 2021-08-25 DIAGNOSIS — I10 ESSENTIAL HYPERTENSION: ICD-10-CM

## 2021-08-25 DIAGNOSIS — F02.80 EARLY ONSET ALZHEIMER'S DEMENTIA WITHOUT BEHAVIORAL DISTURBANCE (HCC): Primary | ICD-10-CM

## 2021-08-25 DIAGNOSIS — R00.1 BRADYCARDIA: ICD-10-CM

## 2021-08-25 DIAGNOSIS — G30.0 EARLY ONSET ALZHEIMER'S DEMENTIA WITHOUT BEHAVIORAL DISTURBANCE (HCC): Primary | ICD-10-CM

## 2021-08-25 PROCEDURE — 99214 OFFICE O/P EST MOD 30 MIN: CPT | Performed by: PSYCHIATRY & NEUROLOGY

## 2021-08-25 RX ORDER — CLOPIDOGREL BISULFATE 75 MG/1
75 TABLET ORAL DAILY
COMMUNITY
Start: 2021-07-20 | End: 2021-08-25

## 2021-08-25 RX ORDER — GALANTAMINE HYDROBROMIDE 8 MG/1
16 CAPSULE, EXTENDED RELEASE ORAL
Qty: 90 CAPSULE | Refills: 3 | Status: SHIPPED | OUTPATIENT
Start: 2021-08-25 | End: 2022-01-14

## 2021-08-25 NOTE — LETTER
8/25/2021    Patient: Lsiette Service   YOB: 1965   Date of Visit: 8/25/2021     Gordon Woods MD  [address] [address]  Via In Basket    Dear Gordon Woods MD,      Thank you for referring Ms. Chata Hartley to [address] [place] [address] for evaluation. My notes for this consultation are attached. If you have questions, please do not hesitate to call me. I look forward to following your patient along with you.       Sincerely,    Itz Crockett MD

## 2021-08-25 NOTE — PROGRESS NOTES
Follow-up Visit    Name Maite Norris Age 55 y.o. MRN [id number]  1965       Chief Complaint: memory loss    Returns for follow up. Was recently admitted for syncope. She had a total of two incidents of  Syncope. These incidents were associated with bradycardia. She is no longer driving. It was assumed that it was memantine that caused the bradycardia. The dosage was reduced. I suggested also reducing her galantamine dosage as this was more likely. Assesment and Plan  1. Early onset Alzheimer's  Based on neuropasyche exam  - continue galantamine      2. Essential hypertension  - losartan (COZAAR) 50 mg tablet; Take 1 Tab by mouth daily. Dispense: 90 Tab; Refill: 3    3. Bradycardia  Reduced dose of memantine  Reduced dose of galantamine    4. Syncope  syncope    Allergies  Adhesive, Sulfa (sulfonamide antibiotics), and Topamax [topiramate]     Medications  Current Outpatient Medications   Medication Sig    galantamine (RAZADYNE) 8 mg SR capsule Take 2 Capsules by mouth Daily (before breakfast).  memantine (NAMENDA) 10 mg tablet Take 1 Tab by mouth two (2) times a day. (Patient taking differently: Take 5 mg by mouth two (2) times a day.)     No current facility-administered medications for this visit. Medical History  Past Medical History:   Diagnosis Date    Essential hypertension     Migraine        Review of Systems   Constitutional: Negative for chills and fever. HENT: Negative for ear pain. Eyes: Negative for pain and discharge. Respiratory: Negative for cough and hemoptysis. Cardiovascular: Negative for chest pain and claudication. Gastrointestinal: Negative for constipation and diarrhea. Genitourinary: Negative for flank pain and hematuria. Musculoskeletal: Negative for back pain and myalgias. Skin: Negative for itching and rash. Neurological: Negative for headaches. Endo/Heme/Allergies: Negative for environmental allergies. Does not bruise/bleed easily. Psychiatric/Behavioral: Positive for memory loss. Negative for depression and hallucinations. Exam:  Visit Vitals  /80 (BP 1 Location: Left upper arm, BP Patient Position: Sitting, BP Cuff Size: Adult)   Pulse 94   Temp 98.6 °F (37 °C) (Oral)   Resp 17   Ht 5' 9\" (1.753 m)   Wt 153 lb (69.4 kg)   SpO2 95%   BMI 22.59 kg/m²        General: Well developed, well nourished. Patient in no apparent distress   Head: Normocephalic, atraumatic, anicteric sclera   Neck Normal ROM, No thyromegally   Lungs:  Clear to auscultation    Cardiac: Regular rate and rhythm with no murmurs. Abd: Bowel sounds were audible. Ext: No pedal edema   Skin: Supple no rash     NeurologicExam:  Mental Status: Alert and oriented to person place and time   Speech: Fluent no aphasia or dysarthria. Cranial Nerves:  II - XII Intact   Motor:  Full and symmetric strength of upper and lower extremities. Normal bulk and tone. Reflexes:   Deep tendon reflexes 2+/4 and symmetric. Sensory:   Symmetric and intact with no perceived deficits . Gait:  Gait is balanced  with normal arm swing. Tremor:   No tremor noted. Cerebellar:  Coordination intact. Neurovascular: No carotid bruits.  No JVD          Lab Review  Lab Results   Component Value Date/Time    WBC 5.2 04/07/2011 12:00 PM    HCT 38.7 04/07/2011 12:00 PM    HGB 13.8 04/07/2011 12:00 PM    PLATELET 821 24/70/9675 12:00 PM       Lab Results   Component Value Date/Time    Sodium 141 10/09/2018 09:04 AM    Potassium 4.1 10/09/2018 09:04 AM    Chloride 102 10/09/2018 09:04 AM    CO2 23 10/09/2018 09:04 AM    Glucose 95 10/09/2018 09:04 AM    BUN 14 10/09/2018 09:04 AM    Creatinine 0.92 10/09/2018 09:04 AM    Calcium 9.5 10/09/2018 09:04 AM         Lab Results   Component Value Date/Time    Vitamin B12 330 03/04/2020 09:22 AM         Lab Results   Component Value Date/Time    Cholesterol, total 194 10/09/2018 09:04 AM    HDL Cholesterol 62 10/09/2018 09:04 AM    LDL, calculated 114 (H) 10/09/2018 09:04 AM    VLDL, calculated 18 10/09/2018 09:04 AM    Triglyceride 88 10/09/2018 09:04 AM

## 2021-08-25 NOTE — PROGRESS NOTES
Chief Complaint   Patient presents with    Follow-up     patient has fainted twice since last visit, first in [place] hospitalised for it. not sure why she fainted. It was on vacation in [place].

## 2021-09-07 DIAGNOSIS — F02.80 EARLY ONSET ALZHEIMER'S DEMENTIA WITHOUT BEHAVIORAL DISTURBANCE (HCC): ICD-10-CM

## 2021-09-07 DIAGNOSIS — G30.0 EARLY ONSET ALZHEIMER'S DEMENTIA WITHOUT BEHAVIORAL DISTURBANCE (HCC): ICD-10-CM

## 2021-09-08 RX ORDER — MEMANTINE HYDROCHLORIDE 10 MG/1
10 TABLET ORAL 2 TIMES DAILY
Qty: 180 TABLET | Refills: 0 | Status: SHIPPED | OUTPATIENT
Start: 2021-09-08 | End: 2021-11-29

## 2021-11-24 DIAGNOSIS — F02.80 EARLY ONSET ALZHEIMER'S DEMENTIA WITHOUT BEHAVIORAL DISTURBANCE (HCC): ICD-10-CM

## 2021-11-24 DIAGNOSIS — G30.0 EARLY ONSET ALZHEIMER'S DEMENTIA WITHOUT BEHAVIORAL DISTURBANCE (HCC): ICD-10-CM

## 2021-11-29 RX ORDER — MEMANTINE HYDROCHLORIDE 10 MG/1
TABLET ORAL
Qty: 180 TABLET | Refills: 3 | Status: SHIPPED | OUTPATIENT
Start: 2021-11-29

## 2022-01-12 DIAGNOSIS — G30.0 EARLY ONSET ALZHEIMER'S DEMENTIA WITHOUT BEHAVIORAL DISTURBANCE (HCC): ICD-10-CM

## 2022-01-12 DIAGNOSIS — F02.80 EARLY ONSET ALZHEIMER'S DEMENTIA WITHOUT BEHAVIORAL DISTURBANCE (HCC): ICD-10-CM

## 2022-01-14 RX ORDER — GALANTAMINE HYDROBROMIDE 8 MG/1
CAPSULE, EXTENDED RELEASE ORAL
Qty: 180 CAPSULE | Refills: 3 | Status: SHIPPED | OUTPATIENT
Start: 2022-01-14

## 2022-01-29 ENCOUNTER — HOSPITAL ENCOUNTER (EMERGENCY)
Age: 57
Discharge: HOME | End: 2022-01-29
Attending: EMERGENCY MEDICINE
Payer: COMMERCIAL

## 2022-01-29 ENCOUNTER — APPOINTMENT (OUTPATIENT)
Dept: GENERAL RADIOLOGY | Age: 57
End: 2022-01-29
Attending: EMERGENCY MEDICINE
Payer: COMMERCIAL

## 2022-01-29 VITALS
HEIGHT: 69 IN | HEART RATE: 64 BPM | WEIGHT: 150 LBS | SYSTOLIC BLOOD PRESSURE: 143 MMHG | TEMPERATURE: 97.6 F | OXYGEN SATURATION: 99 % | BODY MASS INDEX: 22.22 KG/M2 | RESPIRATION RATE: 15 BRPM | DIASTOLIC BLOOD PRESSURE: 82 MMHG

## 2022-01-29 DIAGNOSIS — I95.1 SYNCOPE DUE TO ORTHOSTATIC HYPOTENSION: Primary | ICD-10-CM

## 2022-01-29 PROCEDURE — 99284 EMERGENCY DEPT VISIT MOD MDM: CPT

## 2022-01-29 PROCEDURE — 93005 ELECTROCARDIOGRAM TRACING: CPT

## 2022-01-30 LAB
ATRIAL RATE: 54 BPM
CALCULATED P AXIS, ECG09: 72 DEGREES
CALCULATED R AXIS, ECG10: 34 DEGREES
CALCULATED T AXIS, ECG11: 57 DEGREES
DIAGNOSIS, 93000: NORMAL
P-R INTERVAL, ECG05: 126 MS
Q-T INTERVAL, ECG07: 440 MS
QRS DURATION, ECG06: 84 MS
QTC CALCULATION (BEZET), ECG08: 417 MS
VENTRICULAR RATE, ECG03: 54 BPM

## 2022-01-30 NOTE — ED NOTES
Patient arrives via EMS cc Syncope episode about an hour ago. This her third episode since August 2021. She is on 3 medications, Citalopram, Memantine, and Galatamine. Per her family she lost consciousness for about 5 seconds.  states that wife called out for him and said that she didn't  feel well and that she was hot, clammy, and then fell into 's arms. He states he took her blood pressure and it was 93/69, pulse 51. Patient states that she doesn't remember falling or passing out. Patient is diagnosed with early onset Dementia/Alzheimers and is in an Alzheimer's Study at [facility] since 2019. She was diagnosed in 2018. is A&O x4.

## 2022-01-30 NOTE — ED NOTES
Discharge paperwork reviewed with patient, no questions at this time. Patient is being transferred to her 's vehicle via wheelchair.  is driving patient home.

## 2022-01-30 NOTE — ED PROVIDER NOTES
EMERGENCY DEPARTMENT HISTORY AND PHYSICAL EXAM      Date: 1/29/2022  Patient Name: Jeny Etienne    History of Presenting Illness     Chief Complaint   Patient presents with    Syncope       History Provided By: Patient and Patient's     HPI: Jeny Etienne, 55 y.o. female with history of early onset dementia presents to the ED with cc of syncopal episode. This occurred at home with few hours prior to arrival.  Patient was sitting on her couch watching TV when she stood up and developed lightheadedness. She rested for a minute until it resolved and then walked into the kitchen. She then developed worsening lightheadedness and felt flushed, called for her  who came to her side. She then developed a syncopal episode and he caught her laying her down gently onto the floor. Denies any head injury. She lost consciousness for approximately 5 seconds before returning to normal mental status. Denies any seizure activity, urinary incontinence, or tongue biting. This is happened a few times in the past.  She denies any lightheadedness, chest pain, palpitations, or other preceding symptoms. Currently she feels well and at baseline. Denies any recent medication changes. She was feeling well earlier in the day without any change to activity or appetite. There are no other complaints, changes, or physical findings at this time. PCP: Sasha Javier NP    No current facility-administered medications on file prior to encounter.      Current Outpatient Medications on File Prior to Encounter   Medication Sig Dispense Refill    galantamine (RAZADYNE) 8 mg SR capsule TAKE 2 CAPSULES BY MOUTH  DAILY BEFORE BREAKFAST 180 Capsule 3    memantine (NAMENDA) 10 mg tablet TAKE 1 TABLET BY MOUTH  TWICE DAILY 180 Tablet 3       Past History     Past Medical History:  Past Medical History:   Diagnosis Date    Essential hypertension     Migraine        Past Surgical History:  Past Surgical History: Procedure Laterality Date    HX  SECTION  , ,     HX MOHS PROCEDURES  2008    left    HX ORTHOPAEDIC Left     RCR    HX OTHER SURGICAL      Polyp removed        Family History:  Family History   Problem Relation Age of Onset   [facility] Stroke Mother     Heart Disease Mother     Hypertension Mother     Cancer Father         prostate    Cancer Brother         prostate    Hypertension Brother     Heart Disease Maternal Grandmother     Stroke Maternal Grandmother     Heart Disease Maternal Grandfather     Cancer Other         rhabdosarcoma       Social History:  Social History     Tobacco Use    Smoking status: Never Smoker    Smokeless tobacco: Never Used   Substance Use Topics    Alcohol use: Yes     Alcohol/week: 2.0 standard drinks     Types: 2 Glasses of wine per week     Comment: 2-4/wk    Drug use: No       Allergies: Allergies   Allergen Reactions    Adhesive Hives     Adhesive tapes    Sulfa (Sulfonamide Antibiotics) Nausea and Vomiting    Topamax [Topiramate] Nausea and Vomiting         Review of Systems   Review of Systems   Constitutional: Negative for chills and fever. Eyes: Negative for visual disturbance. Respiratory: Negative for cough and shortness of breath. Cardiovascular: Negative for chest pain and leg swelling. Gastrointestinal: Negative for abdominal pain, nausea and vomiting. Genitourinary: Negative. Musculoskeletal: Negative for back pain and gait problem. Skin: Negative for color change and rash. Neurological: Positive for syncope and light-headedness. Negative for dizziness, weakness and headaches. All other systems reviewed and are negative. Physical Exam   Physical Exam  Vitals and nursing note reviewed. Constitutional:       General: She is not in acute distress. Appearance: Normal appearance. She is not ill-appearing or toxic-appearing. HENT:      Head: Normocephalic and atraumatic.       Nose: Nose normal. Mouth/Throat:      Mouth: Mucous membranes are moist.   Eyes:      Extraocular Movements: Extraocular movements intact. Pupils: Pupils are equal, round, and reactive to light. Cardiovascular:      Rate and Rhythm: Normal rate and regular rhythm. Heart sounds: No murmur heard. Pulmonary:      Effort: Pulmonary effort is normal. No respiratory distress. Breath sounds: Normal breath sounds. No wheezing. Abdominal:      General: There is no distension. Palpations: Abdomen is soft. Tenderness: There is no abdominal tenderness. There is no guarding or rebound. Musculoskeletal:         General: No swelling or tenderness. Normal range of motion. Cervical back: Normal range of motion and neck supple. Right lower leg: No edema. Left lower leg: No edema. Skin:     General: Skin is warm and dry. Coloration: Skin is not pale. Findings: No erythema. Neurological:      General: No focal deficit present. Mental Status: She is alert and oriented to person, place, and time. Comments: Cranial nerves intact, motor 5/5 throughout all extremities, sensation gross intact, no cerebellar deficits. No focal deficits. Diagnostic Study Results     Labs -     No results found for this or any previous visit (from the past 12 hour(s)). Radiologic Studies -   XR CHEST PORT    (Results Pending)     CT Results  (Last 48 hours)    None        CXR Results  (Last 48 hours)    None          Medical Decision Making   I am the first provider for this patient. I reviewed the vital signs, available nursing notes, past medical history, past surgical history, family history and social history. Vital Signs-Reviewed the patient's vital signs.   Visit Vitals  BP (!) 143/82 (BP 1 Location: Right upper arm, BP Patient Position: At rest;Lying)   Pulse 64   Temp 97.6 °F (36.4 °C)   Resp 15   Ht 5' 9\" (1.753 m)   Wt 68 kg (150 lb)   SpO2 99%   BMI 22.15 kg/m²       Records Reviewed: Nursing Notes    Provider Notes (Medical Decision Making):   78-year-old female presenting with  for syncopal episode. Symptoms appear consistent with orthostatic syncope. Vital signs stable and she is hemodynamically stable upon arrival in ED. She has no focal neurologic deficits and no signs of head injury or trauma. She has no complaints and feels at baseline. Doubt arrhythmia, ACS, PE, ICH. EKG reassuring without acute ischemic changes or AV block. She has been evaluated on cardiac monitor without arrhythmia or alarms. Offered evaluation with blood work, orthostatic vital signs, chest x-ray, urinalysis but patient would like to decline seeing that she feels at her baseline. I feel this is reasonable, encourage follow-up with her neurologist and PCP. Patient given strict return precautions all questions answered. ED Course:   Initial assessment performed. The patients presenting problems have been discussed, and they are in agreement with the care plan formulated and outlined with them. I have encouraged them to ask questions as they arise throughout their visit. ED Course as of 01/30/22 1457   Sat Jan 29, 2022   2141 EKG per my interpretation sinus bradycardia, rate 54 bpm, normal axis, no acute ischemic changes, no interval changes. [AK]      ED Course User Index  [AK] Maurene Heimlich, MD       Discharge Note:  The patient has been re-evaluated and is ready for discharge. Reviewed available results with patient. Counseled patient on diagnosis and care plan. Patient has expressed understanding, and all questions have been answered. Patient agrees with plan and agrees to follow up as recommended, or to return to the ED if their symptoms worsen. Discharge instructions have been provided and explained to the patient, along with reasons to return to the ED. Disposition:  Discharge    DISCHARGE PLAN:  1. Discharge Medication List as of 1/29/2022 10:57 PM        2.    Follow-up Information     Follow up With Specialties Details Why Contact Info    Roxanne Rosenthal NP Nurse Practitioner Schedule an appointment as soon as possible for a visit   [id number]  [facility]  [phone]      Your Neurologist  Schedule an appointment as soon as possible for a visit       [facility] EMERGENCY DEPT Emergency Medicine Go to  If symptoms worsen, As needed [address] [place] [address]  [phone]        3. Return to ED if worse     Diagnosis     Clinical Impression:   1. Syncope due to orthostatic hypotension        Attestations:  I am the first and primary provider of record for this patient's ED encounter. I personally performed the services described above in this documentation. Dinah Coates MD    Please note that this dictation was completed with [employer], the computer voice recognition software. Quite often unanticipated grammatical, syntax, homophones, and other interpretive errors are inadvertently transcribed by the computer software. Please disregard these errors. Please excuse any errors that have escaped final proofreading. Thank you.

## 2022-03-18 PROBLEM — I10 ESSENTIAL HYPERTENSION: Status: ACTIVE | Noted: 2018-12-14

## 2022-06-24 ENCOUNTER — TRANSCRIBE ORDER (OUTPATIENT)
Dept: SCHEDULING | Age: 57
End: 2022-06-24

## 2022-06-24 DIAGNOSIS — M25.611 DECREASED RANGE OF MOTION OF RIGHT SHOULDER: ICD-10-CM

## 2022-06-24 DIAGNOSIS — M25.511 PAIN, JOINT, SHOULDER, RIGHT: Primary | ICD-10-CM

## 2022-07-01 ENCOUNTER — HOSPITAL ENCOUNTER (OUTPATIENT)
Dept: MRI IMAGING | Age: 57
Discharge: HOME | End: 2022-07-01
Attending: NURSE PRACTITIONER
Payer: COMMERCIAL

## 2022-07-01 DIAGNOSIS — M25.511 PAIN, JOINT, SHOULDER, RIGHT: ICD-10-CM

## 2022-07-01 DIAGNOSIS — M25.611 DECREASED RANGE OF MOTION OF RIGHT SHOULDER: ICD-10-CM

## 2022-07-01 PROCEDURE — 73221 MRI JOINT UPR EXTREM W/O DYE: CPT

## 2022-07-30 ENCOUNTER — HOSPITAL ENCOUNTER (EMERGENCY)
Age: 57
Discharge: HOME | End: 2022-07-30
Attending: EMERGENCY MEDICINE
Payer: COMMERCIAL

## 2022-07-30 VITALS
TEMPERATURE: 97.7 F | HEIGHT: 69 IN | RESPIRATION RATE: 16 BRPM | BODY MASS INDEX: 21.48 KG/M2 | SYSTOLIC BLOOD PRESSURE: 154 MMHG | HEART RATE: 79 BPM | OXYGEN SATURATION: 98 % | WEIGHT: 145 LBS | DIASTOLIC BLOOD PRESSURE: 94 MMHG

## 2022-07-30 DIAGNOSIS — R07.9 CHEST PAIN, UNSPECIFIED TYPE: Primary | ICD-10-CM

## 2022-07-30 DIAGNOSIS — R11.0 NAUSEA WITHOUT VOMITING: ICD-10-CM

## 2022-07-30 LAB
ALBUMIN SERPL-MCNC: 3.5 G/DL (ref 3.5–5)
ALBUMIN/GLOB SERPL: 1.2 {RATIO} (ref 1.1–2.2)
ALP SERPL-CCNC: 87 U/L (ref 45–117)
ALT SERPL-CCNC: 14 U/L (ref 12–78)
ANION GAP SERPL CALC-SCNC: 7 MMOL/L (ref 5–15)
APPEARANCE UR: CLEAR
AST SERPL-CCNC: 13 U/L (ref 15–37)
BACTERIA URNS QL MICRO: NEGATIVE /HPF
BASOPHILS # BLD: 0.1 K/UL (ref 0–0.1)
BASOPHILS NFR BLD: 1 % (ref 0–1)
BILIRUB SERPL-MCNC: 0.3 MG/DL (ref 0.2–1)
BILIRUB UR QL: NEGATIVE
BUN SERPL-MCNC: 22 MG/DL (ref 6–20)
BUN/CREAT SERPL: 27 (ref 12–20)
CALCIUM SERPL-MCNC: 8.5 MG/DL (ref 8.5–10.1)
CHLORIDE SERPL-SCNC: 110 MMOL/L (ref 97–108)
CO2 SERPL-SCNC: 26 MMOL/L (ref 21–32)
COLOR UR: ABNORMAL
CREAT SERPL-MCNC: 0.81 MG/DL (ref 0.55–1.02)
D DIMER PPP FEU-MCNC: 0.19 MG/L FEU (ref 0–0.65)
DIFFERENTIAL METHOD BLD: NORMAL
EOSINOPHIL # BLD: 0.1 K/UL (ref 0–0.4)
EOSINOPHIL NFR BLD: 2 % (ref 0–7)
EPITH CASTS URNS QL MICRO: ABNORMAL /LPF
ERYTHROCYTE [DISTWIDTH] IN BLOOD BY AUTOMATED COUNT: 14 % (ref 11.5–14.5)
GLOBULIN SER CALC-MCNC: 2.9 G/DL (ref 2–4)
GLUCOSE SERPL-MCNC: 85 MG/DL (ref 65–100)
GLUCOSE UR STRIP.AUTO-MCNC: NEGATIVE MG/DL
HCT VFR BLD AUTO: 39.2 % (ref 35–47)
HGB BLD-MCNC: 13.3 G/DL (ref 11.5–16)
HGB UR QL STRIP: NEGATIVE
HYALINE CASTS URNS QL MICRO: ABNORMAL /LPF (ref 0–2)
IMM GRANULOCYTES # BLD AUTO: 0 K/UL (ref 0–0.04)
IMM GRANULOCYTES NFR BLD AUTO: 0 % (ref 0–0.5)
KETONES UR QL STRIP.AUTO: NEGATIVE MG/DL
LEUKOCYTE ESTERASE UR QL STRIP.AUTO: NEGATIVE
LYMPHOCYTES # BLD: 1.5 K/UL (ref 0.8–3.5)
LYMPHOCYTES NFR BLD: 26 % (ref 12–49)
MCH RBC QN AUTO: 31.7 PG (ref 26–34)
MCHC RBC AUTO-ENTMCNC: 33.9 G/DL (ref 30–36.5)
MCV RBC AUTO: 93.6 FL (ref 80–99)
MONOCYTES # BLD: 0.5 K/UL (ref 0–1)
MONOCYTES NFR BLD: 9 % (ref 5–13)
NEUTS SEG # BLD: 3.4 K/UL (ref 1.8–8)
NEUTS SEG NFR BLD: 62 % (ref 32–75)
NITRITE UR QL STRIP.AUTO: NEGATIVE
NRBC # BLD: 0 K/UL (ref 0–0.01)
NRBC BLD-RTO: 0 PER 100 WBC
PH UR STRIP: 8.5 [PH] (ref 5–8)
PLATELET # BLD AUTO: 264 K/UL (ref 150–400)
PMV BLD AUTO: 10.4 FL (ref 8.9–12.9)
POTASSIUM SERPL-SCNC: 3.4 MMOL/L (ref 3.5–5.1)
PROT SERPL-MCNC: 6.4 G/DL (ref 6.4–8.2)
PROT UR STRIP-MCNC: NEGATIVE MG/DL
RBC # BLD AUTO: 4.19 M/UL (ref 3.8–5.2)
RBC #/AREA URNS HPF: ABNORMAL /HPF (ref 0–5)
SODIUM SERPL-SCNC: 143 MMOL/L (ref 136–145)
SP GR UR REFRACTOMETRY: 1.01
TROPONIN-HIGH SENSITIVITY: 4 NG/L (ref 0–51)
TROPONIN-HIGH SENSITIVITY: <4 NG/L (ref 0–51)
UA: UC IF INDICATED,UAUC: ABNORMAL
UROBILINOGEN UR QL STRIP.AUTO: 0.2 EU/DL (ref 0.2–1)
WBC # BLD AUTO: 5.5 K/UL (ref 3.6–11)
WBC URNS QL MICRO: ABNORMAL /HPF (ref 0–4)

## 2022-07-30 PROCEDURE — 84484 ASSAY OF TROPONIN QUANT: CPT

## 2022-07-30 PROCEDURE — 93005 ELECTROCARDIOGRAM TRACING: CPT

## 2022-07-30 PROCEDURE — 99284 EMERGENCY DEPT VISIT MOD MDM: CPT

## 2022-07-30 PROCEDURE — 80053 COMPREHEN METABOLIC PANEL: CPT

## 2022-07-30 PROCEDURE — 85379 FIBRIN DEGRADATION QUANT: CPT

## 2022-07-30 PROCEDURE — 36415 COLL VENOUS BLD VENIPUNCTURE: CPT

## 2022-07-30 PROCEDURE — 81001 URINALYSIS AUTO W/SCOPE: CPT

## 2022-07-30 PROCEDURE — 74011250636 HC RX REV CODE- 250/636: Performed by: EMERGENCY MEDICINE

## 2022-07-30 PROCEDURE — 85025 COMPLETE CBC W/AUTO DIFF WBC: CPT

## 2022-07-30 PROCEDURE — 96374 THER/PROPH/DIAG INJ IV PUSH: CPT

## 2022-07-30 RX ORDER — ONDANSETRON 2 MG/ML
4 INJECTION INTRAMUSCULAR; INTRAVENOUS
Status: COMPLETED | OUTPATIENT
Start: 2022-07-30 | End: 2022-07-30

## 2022-07-30 RX ADMIN — ONDANSETRON 4 MG: 2 INJECTION INTRAMUSCULAR; INTRAVENOUS at 18:01

## 2022-07-30 NOTE — ED PROVIDER NOTES
EMERGENCY DEPARTMENT HISTORY AND PHYSICAL EXAM      Date: 7/30/2022  Patient Name: Roseline Avila    History of Presenting Illness     Chief Complaint   Patient presents with    Dizziness     Pt arrives via EMS with cc of dizziness and shaking. Per EMS patient just arrived home from a 9 hour car ride from [place]. Patient hx of early onset dementia and HTN       History Provided By: Patient    HPI: Roseline Avila, 65 y.o. female with history of hypertension, early onset while summers dementia presents to the ED with cc of chest discomfort, nausea. Symptoms onset about an hour prior to arrival.  Patient had just returned from a 9-hour car trip from [place]. She got home with her family around 2 PM.  She ate a sandwich and then laid down to take a nap and was in her normal state of health. She woke up short time afterwards and did not feel like herself. She endorses some nausea and a sensation that she was shaking inside. She felt that she could not control the symptoms and she called her family members for help. Family members found her curled into fetal position hyperventilating. She was responsive during this entire time and not complaining of any pain. Patient remembers that this entire episode, it lasted a few minutes before resolving. EMS noted that patient was at normal baseline and POC glucose normal.  Patient endorses mild chest discomfort described as a nauseated feeling but she denies any vomiting, palpitations, shortness of breath, leg pain or leg swelling, fevers, or any recent illness. There are no other complaints, changes, or physical findings at this time. PCP: Jacques Reed NP    No current facility-administered medications on file prior to encounter.      Current Outpatient Medications on File Prior to Encounter   Medication Sig Dispense Refill    galantamine (RAZADYNE) 8 mg SR capsule TAKE 2 CAPSULES BY MOUTH  DAILY BEFORE BREAKFAST 180 Capsule 3    memantine (NAMENDA) 10 mg tablet TAKE 1 TABLET BY MOUTH  TWICE DAILY 180 Tablet 3       Past History     Past Medical History:  Past Medical History:   Diagnosis Date    Essential hypertension     Migraine        Past Surgical History:  Past Surgical History:   Procedure Laterality Date    HX  SECTION  , ,     HX MOHS PROCEDURES  2008    left    HX ORTHOPAEDIC Left     RCR    HX OTHER SURGICAL      Polyp removed        Family History:  Family History   Problem Relation Age of Onset    Stroke Mother     Heart Disease Mother     Hypertension Mother     Cancer Father         prostate    Cancer Brother         prostate    Hypertension Brother     Heart Disease Maternal Grandmother     Stroke Maternal Grandmother     Heart Disease Maternal Grandfather     Cancer Other         rhabdosarcoma       Social History:  Social History     Tobacco Use    Smoking status: Never    Smokeless tobacco: Never   Substance Use Topics    Alcohol use: Yes     Alcohol/week: 2.0 standard drinks     Types: 2 Glasses of wine per week     Comment: 2-4/wk    Drug use: No       Allergies: Allergies   Allergen Reactions    Adhesive Hives     Adhesive tapes    Sulfa (Sulfonamide Antibiotics) Nausea and Vomiting    Topamax [Topiramate] Nausea and Vomiting         Review of Systems   Review of Systems   Constitutional:  Negative for chills and fever. Respiratory:  Negative for cough and shortness of breath. Cardiovascular:  Positive for chest pain. Negative for leg swelling. Gastrointestinal:  Positive for nausea. Negative for abdominal pain and vomiting. Genitourinary: Negative. Musculoskeletal:  Negative for back pain and gait problem. Skin:  Negative for color change and rash. Neurological:  Negative for dizziness, weakness, light-headedness and headaches. All other systems reviewed and are negative. Physical Exam   Physical Exam  Vitals and nursing note reviewed. Constitutional:       General: She is not in acute distress. Appearance: Normal appearance. She is not ill-appearing or toxic-appearing. HENT:      Head: Normocephalic and atraumatic. Nose: Nose normal.      Mouth/Throat:      Mouth: Mucous membranes are moist.   Eyes:      Extraocular Movements: Extraocular movements intact. Pupils: Pupils are equal, round, and reactive to light. Cardiovascular:      Rate and Rhythm: Normal rate and regular rhythm. Heart sounds: No murmur heard. Pulmonary:      Effort: Pulmonary effort is normal. No respiratory distress. Breath sounds: Normal breath sounds. No wheezing. Abdominal:      General: There is no distension. Palpations: Abdomen is soft. Tenderness: There is no abdominal tenderness. There is no guarding or rebound. Musculoskeletal:         General: No swelling or tenderness. Normal range of motion. Cervical back: Normal range of motion and neck supple. Right lower leg: No edema. Left lower leg: No edema. Skin:     General: Skin is warm and dry. Coloration: Skin is not pale. Findings: No erythema. Neurological:      General: No focal deficit present. Mental Status: She is alert and oriented to person, place, and time. Comments: Cranial nerves intact, motor 5/5 throughout, sensation intact, no cerebellar deficits. NIHSS 0.        Diagnostic Study Results     Labs -     Recent Results (from the past 12 hour(s))   CBC WITH AUTOMATED DIFF    Collection Time: 07/30/22  5:11 PM   Result Value Ref Range    WBC 5.5 3.6 - 11.0 K/uL    RBC 4.19 3.80 - 5.20 M/uL    HGB 13.3 11.5 - 16.0 g/dL    HCT 39.2 35.0 - 47.0 %    MCV 93.6 80.0 - 99.0 FL    MCH 31.7 26.0 - 34.0 PG    MCHC 33.9 30.0 - 36.5 g/dL    RDW 14.0 11.5 - 14.5 %    PLATELET 548 735 - 239 K/uL    MPV 10.4 8.9 - 12.9 FL    NRBC 0.0 0  WBC    ABSOLUTE NRBC 0.00 0.00 - 0.01 K/uL    NEUTROPHILS 62 32 - 75 %    LYMPHOCYTES 26 12 - 49 %    MONOCYTES 9 5 - 13 %    EOSINOPHILS 2 0 - 7 %    BASOPHILS 1 0 - 1 %    IMMATURE GRANULOCYTES 0 0.0 - 0.5 %    ABS. NEUTROPHILS 3.4 1.8 - 8.0 K/UL    ABS. LYMPHOCYTES 1.5 0.8 - 3.5 K/UL    ABS. MONOCYTES 0.5 0.0 - 1.0 K/UL    ABS. EOSINOPHILS 0.1 0.0 - 0.4 K/UL    ABS. BASOPHILS 0.1 0.0 - 0.1 K/UL    ABS. IMM. GRANS. 0.0 0.00 - 0.04 K/UL    DF AUTOMATED     METABOLIC PANEL, COMPREHENSIVE    Collection Time: 07/30/22  5:11 PM   Result Value Ref Range    Sodium 143 136 - 145 mmol/L    Potassium 3.4 (L) 3.5 - 5.1 mmol/L    Chloride 110 (H) 97 - 108 mmol/L    CO2 26 21 - 32 mmol/L    Anion gap 7 5 - 15 mmol/L    Glucose 85 65 - 100 mg/dL    BUN 22 (H) 6 - 20 MG/DL    Creatinine 0.81 0.55 - 1.02 MG/DL    BUN/Creatinine ratio 27 (H) 12 - 20      GFR est AA >60 >60 ml/min/1.73m2    GFR est non-AA >60 >60 ml/min/1.73m2    Calcium 8.5 8.5 - 10.1 MG/DL    Bilirubin, total 0.3 0.2 - 1.0 MG/DL    ALT (SGPT) 14 12 - 78 U/L    AST (SGOT) 13 (L) 15 - 37 U/L    Alk.  phosphatase 87 45 - 117 U/L    Protein, total 6.4 6.4 - 8.2 g/dL    Albumin 3.5 3.5 - 5.0 g/dL    Globulin 2.9 2.0 - 4.0 g/dL    A-G Ratio 1.2 1.1 - 2.2     TROPONIN-HIGH SENSITIVITY    Collection Time: 07/30/22  5:11 PM   Result Value Ref Range    Troponin-High Sensitivity 4 0 - 51 ng/L   D DIMER    Collection Time: 07/30/22  6:02 PM   Result Value Ref Range    D-dimer 0.19 0.00 - 0.65 mg/L FEU   URINALYSIS W/ REFLEX CULTURE    Collection Time: 07/30/22  6:02 PM    Specimen: Urine   Result Value Ref Range    Color YELLOW/STRAW      Appearance CLEAR CLEAR      Specific gravity 1.010      pH (UA) 8.5 (H) 5.0 - 8.0      Protein Negative NEG mg/dL    Glucose Negative NEG mg/dL    Ketone Negative NEG mg/dL    Bilirubin Negative NEG      Blood Negative NEG      Urobilinogen 0.2 0.2 - 1.0 EU/dL    Nitrites Negative NEG      Leukocyte Esterase Negative NEG      UA:UC IF INDICATED CULTURE NOT INDICATED BY UA RESULT CNI      WBC 0-4 0 - 4 /hpf    RBC 0-5 0 - 5 /hpf    Epithelial cells FEW FEW /lpf    Bacteria Negative NEG /hpf    Hyaline cast 0-2 0 - 2 /lpf   TROPONIN-HIGH SENSITIVITY    Collection Time: 07/30/22  6:57 PM   Result Value Ref Range    Troponin-High Sensitivity <4 0 - 51 ng/L       Radiologic Studies -   No orders to display     CT Results  (Last 48 hours)      None          CXR Results  (Last 48 hours)      None            Medical Decision Making   I am the first provider for this patient. I reviewed the vital signs, available nursing notes, past medical history, past surgical history, family history and social history. Vital Signs-Reviewed the patient's vital signs. Patient Vitals for the past 12 hrs:   Temp Pulse Resp BP SpO2   07/30/22 2042 -- 79 16 (!) 154/94 98 %   07/30/22 1944 -- (!) 56 20 (!) 183/88 98 %   07/30/22 1915 -- 67 12 (!) 151/91 98 %   07/30/22 1843 -- (!) 52 11 (!) 157/84 98 %   07/30/22 1708 97.7 °F (36.5 °C) 70 16 (!) 173/77 98 %       Records Reviewed: Nursing Notes    Provider Notes (Medical Decision Making):   30-year-old female with above history presenting with episode of not feeling well with chest discomfort and nausea and episode of hyperventilation that concern family lasting a few minutes prior to arrival.  She is afebrile and vital signs stable. Exam as above. NIHSS 0 and she has no focal deficits on my exam.  Differential diagnosis includes arrhythmia, palpitations, ACS, PE, anxiety, GERD. Plan to evaluate with high-sensitivity troponin x2, EKG, chest x-ray, D-dimer, cardiac monitoring, and reassess. Troponin negative, dimer negative. Patient feeling better on reassessment. She has been on cardiac monitor without any arrhythmia. Feel she is stable for discharge home at this time. Encouraged close follow-up with PCP. Patient given strict return to ED precautions and all questions answered. ED Course:   Initial assessment performed. The patients presenting problems have been discussed, and they are in agreement with the care plan formulated and outlined with them.   I have encouraged them to ask questions as they arise throughout their visit. ED Course as of 07/30/22 2336   Sat Jul 30, 2022 2050 EKG per my interpretation sinus rhythm, rate 65 bpm, normal axis, no acute ischemic changes or interval changes. [AK]      ED Course User Index  [AK] Claire Hurst MD         Cardiac Monitoring: The cardiac monitor revealed the following rhythm as interpreted by me: Normal Sinus Rhythm, rate 65 bpm  The cardiac monitor was ordered secondary to the patient's reported complaint of this discomfort and to monitor the patient for dysrhythmia. Giuliano Momin MD      Discharge Note:  The patient has been re-evaluated and is ready for discharge. Reviewed available results with patient. Counseled patient on diagnosis and care plan. Patient has expressed understanding, and all questions have been answered. Patient agrees with plan and agrees to follow up as recommended, or to return to the ED if their symptoms worsen. Discharge instructions have been provided and explained to the patient, along with reasons to return to the ED. Disposition:  Discharge      DISCHARGE PLAN:  1. Discharge Medication List as of 7/30/2022  9:01 PM        2. Follow-up Information       Follow up With Specialties Details Why Contact Info    Yovani Brandon NP Nurse Practitioner Schedule an appointment as soon as possible for a visit   [address]  [phone]      [facility] EMERGENCY DEPT Emergency Medicine Go to  As needed, If symptoms worsen [address] [place] [address] Drive  [address] [facility]  [phone]          3. Return to ED if worse     Diagnosis     Clinical Impression:   1. Chest pain, unspecified type    2. Nausea without vomiting        Attestations:  I am the first and primary provider of record for this patient's ED encounter. I personally performed the services described above in this documentation.   Giuliano Momin MD    Please note that this dictation was completed with Dragon, the computer voice recognition software. Quite often unanticipated grammatical, syntax, homophones, and other interpretive errors are inadvertently transcribed by the computer software. Please disregard these errors. Please excuse any errors that have escaped final proofreading. Thank you.

## 2022-07-30 NOTE — ED NOTES
1712: Assumed care of patient from EMS at this time. Patient transitioned to ED stretcher. Placed on monitor x 3. SR x 2    1750:  at bedside. 1913: Bedside shift change report given to Elizabeth Frost RN (oncoming nurse) by Janina Velez RN (offgoing nurse). Report included the following information SBAR, Kardex, ED Summary, Intake/Output, MAR, and Recent Results.

## 2022-07-31 LAB
ATRIAL RATE: 65 BPM
CALCULATED P AXIS, ECG09: 74 DEGREES
CALCULATED R AXIS, ECG10: 31 DEGREES
CALCULATED T AXIS, ECG11: 50 DEGREES
DIAGNOSIS, 93000: NORMAL
P-R INTERVAL, ECG05: 156 MS
Q-T INTERVAL, ECG07: 436 MS
QRS DURATION, ECG06: 88 MS
QTC CALCULATION (BEZET), ECG08: 453 MS
VENTRICULAR RATE, ECG03: 65 BPM

## 2022-07-31 NOTE — DISCHARGE INSTRUCTIONS
You were evaluated in the emergency department for an episode of chest discomfort, nausea, and shakiness. Your examination was reassuring as was your work-up including EKG and blood work. It will be important for you to follow-up with your primary care physician in 3-7 days. If you develop worsening symptoms such as other episode, worsening chest pain, shortness of breath, fevers, or for any other symptoms, please return to the emergency department immediately.

## 2023-02-13 NOTE — PROGRESS NOTES
Follow-up Visit    Name Erica Oliveros Age 48 y.o. MRN [id number]  1965       Chief Complaint: memory loss    Patient returns for a follow up visit. She continues to complain of memory slips. She started galantamine she has no complaints on the medication. She has stopped the viagra. She is aware of a progression in her memory loss. Neuropsyche evaluation was reviewee and was consisitent with alzheimer's. Assesment and Plan  1. Early onset Alzheimer's  Based on neuropasyche exam  - continue galantamine  - memantine (NAMENDA) 10 mg tablet; Take 1 Tab by mouth two (2) times a day. Dispense: 60 Tab; Refill: 4  Patient declined memory evaluation but will repeat it on next visit. 2. Essential hypertension    - losartan (COZAAR) 50 mg tablet; Take 1 Tab by mouth daily. Dispense: 90 Tab; Refill: 3      Allergies  Adhesive, Sulfa (sulfonamide antibiotics), and Topamax [topiramate]     Medications  Current Outpatient Medications   Medication Sig    galantamine (RAZADYNE) 16 mg SR capsule Take 16 mg by mouth Daily (before breakfast).  memantine (NAMENDA) 5 mg tablet TAKE 2 TABLETS BY MOUTH  TWICE DAILY    amLODIPine (NORVASC) 5 mg tablet     sildenafil citrate (Viagra) 25 mg tablet Take 1 Tab by mouth as needed for Erectile Dysfunction. No current facility-administered medications for this visit. Medical History  Past Medical History:   Diagnosis Date    Essential hypertension     Migraine        Review of Systems   Constitutional: Negative for chills and fever. HENT: Negative for ear pain. Eyes: Negative for pain and discharge. Respiratory: Negative for cough and hemoptysis. Cardiovascular: Negative for chest pain and claudication. Gastrointestinal: Negative for constipation and diarrhea. Genitourinary: Negative for flank pain and hematuria. Musculoskeletal: Negative for back pain and myalgias. Skin: Negative for itching and rash.    Neurological: Negative for headaches. Endo/Heme/Allergies: Negative for environmental allergies. Does not bruise/bleed easily. Psychiatric/Behavioral: Positive for memory loss. Negative for depression and hallucinations. Exam:  Visit Vitals  /88   Pulse 83   Temp 97.6 °F (36.4 °C)   Ht 5' 9\" (1.753 m)   Wt 153 lb (69.4 kg)   SpO2 100%   BMI 22.59 kg/m²        General: Well developed, well nourished. Patient in no apparent distress   Head: Normocephalic, atraumatic, anicteric sclera   Neck Normal ROM, No thyromegally   Lungs:  Clear to auscultation    Cardiac: Regular rate and rhythm with no murmurs. Abd: Bowel sounds were audible. Ext: No pedal edema   Skin: Supple no rash     NeurologicExam:  Mental Status: Alert and oriented to person place and time   Speech: Fluent no aphasia or dysarthria. Cranial Nerves:  II - XII Intact   Motor:  Full and symmetric strength of upper and lower extremities. Normal bulk and tone. Reflexes:   Deep tendon reflexes 2+/4 and symmetric. Sensory:   Symmetric and intact with no perceived deficits . Gait:  Gait is balanced  with normal arm swing. Tremor:   No tremor noted. Cerebellar:  Coordination intact. Neurovascular: No carotid bruits.  No JVD          Lab Review  Lab Results   Component Value Date/Time    WBC 5.2 04/07/2011 12:00 PM    HCT 38.7 04/07/2011 12:00 PM    HGB 13.8 04/07/2011 12:00 PM    PLATELET 219 07/09/6085 12:00 PM       Lab Results   Component Value Date/Time    Sodium 141 10/09/2018 09:04 AM    Potassium 4.1 10/09/2018 09:04 AM    Chloride 102 10/09/2018 09:04 AM    CO2 23 10/09/2018 09:04 AM    Glucose 95 10/09/2018 09:04 AM    BUN 14 10/09/2018 09:04 AM    Creatinine 0.92 10/09/2018 09:04 AM    Calcium 9.5 10/09/2018 09:04 AM         Lab Results   Component Value Date/Time    Vitamin B12 330 03/04/2020 09:22 AM         Lab Results   Component Value Date/Time    Cholesterol, total 194 10/09/2018 09:04 AM    HDL Cholesterol 62 10/09/2018 09:04 AM    LDL, calculated 114 (H) 10/09/2018 09:04 AM    VLDL, calculated 18 10/09/2018 09:04 AM    Triglyceride 88 10/09/2018 09:04 AM no

## 2023-09-18 NOTE — PROGRESS NOTES
HPI  Antonia Cousin is a 47y.o. year old female patient formerly of Lori Crigler, MD who presents with c/o [facility] care. Pt has history of has Migraine on her problem list.. Recently moved to [place], here to [facility]. Has been told she has pre-HTN in the past.  Follows with Dr. Clementina Dandy. Had routine labs there in the Spring. Hasn't had a migraine in four years, doesn't take any daily medications. Has some intermittent chest pressure. Pressure occurs at rest, right side of chest. Sometimes tender when pushes on it. Denies associated SOB. Doesn't radiate. Denies chest pain or palpitations. Denies orthopnea, or PND. Denies lower extremity swelling. Mom had cardiomyopathy in 46s, CVA in 62s. Denies HA, dizziness, or blurred vision. Denies N/V/D or abd pain. Sometimes has constipation, tries to manage with diet. Denies BRBPR or melena. Denies blood in urine. Exercise: goes to the gym, hasn't been recently due to weather  Diet: 70% eats healthy, tries to avoid fried foods and adding salt to foods, eats healthier when she cooks    Follows with Dr. Marjorie Lee for colonoscopy every 3 years due to hx of blood in stool, has had 3-4 pre-cancerous polyps taken out each year. Denies family hx of colon or breast ca. Mom has hx of IBS and diverticulitis. Hasn't seen dermatologist recently. Had an abnormal spot removed many years ago, wasn't told it was cancerous.        Patient Active Problem List   Diagnosis Code    Migraine G43.909     Past Medical History:   Diagnosis Date    Migraine      Past Surgical History:   Procedure Laterality Date    HX  SECTION  , ,     HX MOHS PROCEDURES  2008    left    HX ORTHOPAEDIC Left     RCR    HX OTHER SURGICAL      Polyp removed      Social History     Social History    Marital status:      Spouse name: N/A    Number of children: N/A    Years of education: N/A     Social History Main Topics    Smoking status: Never Smoker    Smokeless tobacco: Never Used    Alcohol use 1.2 oz/week     2 Glasses of wine per week      Comment: 2-4/wk    Drug use: No    Sexual activity: Yes     Birth control/ protection: None      Comment: last period 2016     Other Topics Concern    None     Social History Narrative    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 Previously served in [employer]. Family History   Problem Relation Age of Onset    Stroke Mother     Heart Disease Mother     Hypertension Mother     Cancer Father      prostate    Cancer Brother      prostate    Hypertension Brother     Heart Disease Maternal Grandmother     Stroke Maternal Grandmother     Heart Disease Maternal Grandfather     Cancer Other      rhabdosarcoma     Allergies   Allergen Reactions    Adhesive Hives     Adhesive tapes    Sulfa (Sulfonamide Antibiotics) Nausea and Vomiting    Topamax [Topiramate] Nausea and Vomiting       MEDICATIONS  No current outpatient prescriptions on file. No current facility-administered medications for this visit. REVIEW OF SYSTEMS  Per HPI        Visit Vitals    /80    Pulse 76    Temp 98.2 °F (36.8 °C) (Oral)    Resp 18    Ht 5' 9\" (1.753 m)    Wt 159 lb (72.1 kg)    SpO2 99%    BMI 23.48 kg/m2         General: Well-developed, well-nourished. In no distress. A&O x 3. Head: Normocephalic, atraumatic. Eyes: Conjunctiva clear. Pupils equal, round, reactive to light. Extraocular movements intact. Ears/Nose: Septum midline. Normal nasal mucosa. No drainage or sinus tenderness. Mouth/Throat: Lips, mucosa, and tongue normal. Oropharynx benign. Neck: Supple, symmetrical, trachea midline, no lymphadenopathy, no carotid bruits, no JVD, thyroid: not enlarged, symmetric, no tenderness/mass/nodules. Lungs: Clear to auscultation bilaterally. No crackles or wheezes. No use of accessory muscles. Speaks in full sentences without SOB. Chest Wall: No deformity. Mild ttp over right chest wall. Heart: RRR, normal S1 and S2, no murmur, click, rub, or gallop. Back: Symmetric. ROM intact. Abdomen: Soft, non-distended, bowel sounds normal. No tenderness. No masses. No hepatosplenomegaly. Skin: No rashes or lesions. Neurovasc: No edema appreciated. Dorsalis pedis pulses are 2+ on the right side, and 2+ on the left side. Posterior tibial pulses are 2+ on the right side, and 2+ on the left side. Musculoskeletal: Gait normal.   Psychiatric: Normal mood and affect. Behavior is normal.       ASSESSMENT and PLAN  Diagnoses and all orders for this visit:      1. Encounter to establish care    2. Pre-hypertension  -Discussed lifestyle modifications including increasing exercise and decreasing sodium in diet. -Pt would like to try the above before considering any HTN medications.  -Provided handouts on HTN risk factors, ways to reduce BP, etc.   -Will request records from GYN- may need CMP and lipid panel if none in the last year. 3. Screening exam for skin cancer  -     REFERRAL TO DERMATOLOGY- routine skin exam    5. Musculoskeletal chest pain  -Suspect MSK in nature due to exam  -If symptoms persist would proceed with additional workup    6. Encounter for immunization  -     Influenza virus vaccine (QUADRIVALENT PRES FREE SYRINGE) IM (35960)  -     UT IMMUNIZ ADMIN,1 SINGLE/COMB VAC/TOXOID        Patient Instructions     Vaccine Information Statement    Influenza (Flu) Vaccine (Inactivated or Recombinant): What you need to know    Many Vaccine Information Statements are available in Somali and other languages. See www.immunize.org/vis  Hojas de Información Sobre Vacunas están disponibles en Español y en muchos otros idiomas. Visite www.immunize.org/vis    1. Why get vaccinated? Influenza (flu) is a contagious disease that spreads around the [place] every year, usually between October and May.      Flu is caused by influenza viruses, and is spread mainly by coughing, sneezing, and close contact. Anyone can get flu. Flu strikes suddenly and can last several days. Symptoms vary by age, but can include:   fever/chills   sore throat   muscle aches   fatigue   cough   headache    runny or stuffy nose    Flu can also lead to pneumonia and blood infections, and cause diarrhea and seizures in children. If you have a medical condition, such as heart or lung disease, flu can make it worse. Flu is more dangerous for some people. Infants and young children, people 72years of age and older, pregnant women, and people with certain health conditions or a weakened immune system are at greatest risk. Each year thousands of people in the [facility] die from flu, and many more are hospitalized. Flu vaccine can:   keep you from getting flu,   make flu less severe if you do get it, and   keep you from spreading flu to your family and other people. 2. Inactivated and recombinant flu vaccines    A dose of flu vaccine is recommended every flu season. Children 6 months through 6years of age may need two doses during the same flu season. Everyone else needs only one dose each flu season. Some inactivated flu vaccines contain a very small amount of a mercury-based preservative called thimerosal. Studies have not shown thimerosal in vaccines to be harmful, but flu vaccines that do not contain thimerosal are available. There is no live flu virus in flu shots. They cannot cause the flu. There are many flu viruses, and they are always changing. Each year a new flu vaccine is made to protect against three or four viruses that are likely to cause disease in the upcoming flu season. But even when the vaccine doesnt exactly match these viruses, it may still provide some protection    Flu vaccine cannot prevent:   flu that is caused by a virus not covered by the vaccine, or   illnesses that look like flu but are not.     It takes about 2 weeks for protection to develop after vaccination, and protection lasts through the flu season. 3. Some people should not get this vaccine    Tell the person who is giving you the vaccine:     If you have any severe, life-threatening allergies. If you ever had a life-threatening allergic reaction after a dose of flu vaccine, or have a severe allergy to any part of this vaccine, you may be advised not to get vaccinated. Most, but not all, types of flu vaccine contain a small amount of egg protein.  If you ever had Guillain-Barré Syndrome (also called GBS). Some people with a history of GBS should not get this vaccine. This should be discussed with your doctor.  If you are not feeling well. It is usually okay to get flu vaccine when you have a mild illness, but you might be asked to come back when you feel better. 4. Risks of a vaccine reaction    With any medicine, including vaccines, there is a chance of reactions. These are usually mild and go away on their own, but serious reactions are also possible. Most people who get a flu shot do not have any problems with it. Minor problems following a flu shot include:    soreness, redness, or swelling where the shot was given     hoarseness   sore, red or itchy eyes   cough   fever   aches   headache   itching   fatigue  If these problems occur, they usually begin soon after the shot and last 1 or 2 days. More serious problems following a flu shot can include the following:     There may be a small increased risk of Guillain-Barré Syndrome (GBS) after inactivated flu vaccine. This risk has been estimated at 1 or 2 additional cases per million people vaccinated. This is much lower than the risk of severe complications from flu, which can be prevented by flu vaccine.  Young children who get the flu shot along with pneumococcal vaccine (PCV13) and/or DTaP vaccine at the same time might be slightly more likely to have a seizure caused by fever.  Ask your doctor for more information. Tell your doctor if a child who is getting flu vaccine has ever had a seizure. Problems that could happen after any injected vaccine:      People sometimes faint after a medical procedure, including vaccination. Sitting or lying down for about 15 minutes can help prevent fainting, and injuries caused by a fall. Tell your doctor if you feel dizzy, or have vision changes or ringing in the ears.  Some people get severe pain in the shoulder and have difficulty moving the arm where a shot was given. This happens very rarely.  Any medication can cause a severe allergic reaction. Such reactions from a vaccine are very rare, estimated at about 1 in a million doses, and would happen within a few minutes to a few hours after the vaccination. As with any medicine, there is a very remote chance of a vaccine causing a serious injury or death. The safety of vaccines is always being monitored. For more information, visit: www.cdc.gov/vaccinesafety/    5. What if there is a serious reaction? What should I look for?  Look for anything that concerns you, such as signs of a severe allergic reaction, very high fever, or unusual behavior. Signs of a severe allergic reaction can include hives, swelling of the face and throat, difficulty breathing, a fast heartbeat, dizziness, and weakness - usually within a few minutes to a few hours after the vaccination. What should I do?  If you think it is a severe allergic reaction or other emergency that cant wait, call 9-1-1 and get the person to the nearest hospital. Otherwise, call your doctor.  Reactions should be reported to the Vaccine Adverse Event Reporting System (VAERS). Your doctor should file this report, or you can do it yourself through  the VAERS web site at www.vaers. hhs.gov, or by calling 3-348.480.9964. VAERS does not give medical advice.     6. The National Vaccine Injury [employer]    The National Vaccine Injury Compensation Program (VICP) is a federal program that was created to compensate people who may have been injured by certain vaccines. Persons who believe they may have been injured by a vaccine can learn about the program and about filing a claim by calling 3-324.472.5692 or visiting the [address] website at www.[facility].gov/vaccinecompensation. There is a time limit to file a claim for compensation. 7. How can I learn more?  Ask your healthcare provider. He or she can give you the vaccine package insert or suggest other sources of information.  Call your local or state health department.  Contact the Centers for Disease Control and Prevention (CDC):  - Call 0-711.398.7914 (5-321-RNC-INFO) or  - Visit CDCs website at www.cdc.gov/flu    Vaccine Information Statement   Inactivated Influenza Vaccine   8/7/2015  [address] [id number]    Department of Health and Human Services  Centers for Disease Control and Prevention    Office Use Only        Please keep your follow-up appointment with Dannielle Castro MD.     Follow-up Disposition:  Return in about 3 months (around 12/24/2018), or if symptoms worsen or fail to improve, for blood pressure. Health Maintenance Due   Topic Date Due    PAP AKA CERVICAL CYTOLOGY  05/25/1987    BREAST CANCER SCRN MAMMOGRAM  05/25/2016    FOBT Q 1 YEAR AGE 50-75  05/25/2016    Influenza Age 9 to Adult  08/01/2018   Had [facility] at [facility] 2018, no hx of abnormal pap. Had Mammo at [facility] 2018, no hx of abnormal mammo. Has colonoscopy every 3 years with Dr. Kelsy Brewer. I have discussed the diagnosis with the patient and the intended plan as seen in the above orders. Patient is in agreement. The patient has received an after-visit summary and questions were answered concerning future plans. I have discussed medication side effects and warnings with the patient as well.  Warning signs for the above conditions were discussed including when to call our office or go to the emergency room. The nurse provided the patient and/or family with advanced directive information if needed and encouraged the patient to provide a copy to the office when available. Birth Control Pills Counseling: Birth Control Pill Counseling: I discussed with the patient the potential side effects of OCPs including but not limited to increased risk of stroke, heart attack, thrombophlebitis, deep venous thrombosis, hepatic adenomas, breast changes, GI upset, headaches, and depression.  The patient verbalized understanding of the proper use and possible adverse effects of OCPs. All of the patient's questions and concerns were addressed.

## 2023-12-19 NOTE — DISCHARGE INSTRUCTIONS
You were evaluated in the emergency department for syncopal episode likely due to transient orthostatic hypotension. Your examination was reassuring as was your work-up including EKG. It will be important for you to follow-up with your primary care physician and neurologist within 3-14 days. If you develop worsening symptoms such as recurrent passing out episodes or any chest pain, shortness of breath, or palpitations, please return to the emergency department immediately. It was a pleasure taking care of you at [facility] Emergency Department today. We know that when you come to [facility], you are entrusting us with your health, comfort, and safety. Our physicians and nurses honor that trust, and we truly appreciate the opportunity to care for you and your loved ones. We also value your feedback. If you receive a survey about your Emergency Department experience today, please fill it out. We care about our patients' feedback, and we listen to what you have to say. Thank you!
oral